# Patient Record
Sex: FEMALE | Race: BLACK OR AFRICAN AMERICAN | NOT HISPANIC OR LATINO | Employment: UNEMPLOYED | URBAN - METROPOLITAN AREA
[De-identification: names, ages, dates, MRNs, and addresses within clinical notes are randomized per-mention and may not be internally consistent; named-entity substitution may affect disease eponyms.]

---

## 2017-03-29 ENCOUNTER — OFFICE VISIT (OUTPATIENT)
Dept: CARDIOLOGY | Facility: CLINIC | Age: 52
End: 2017-03-29
Payer: MEDICAID

## 2017-03-29 VITALS
OXYGEN SATURATION: 97 % | DIASTOLIC BLOOD PRESSURE: 74 MMHG | SYSTOLIC BLOOD PRESSURE: 143 MMHG | HEIGHT: 62 IN | HEART RATE: 71 BPM | BODY MASS INDEX: 51.71 KG/M2 | WEIGHT: 281 LBS

## 2017-03-29 DIAGNOSIS — E66.01 MORBID OBESITY DUE TO EXCESS CALORIES: ICD-10-CM

## 2017-03-29 DIAGNOSIS — R06.09 DYSPNEA ON EXERTION: ICD-10-CM

## 2017-03-29 DIAGNOSIS — G47.33 OSA (OBSTRUCTIVE SLEEP APNEA): ICD-10-CM

## 2017-03-29 DIAGNOSIS — I1A.0 RESISTANT HYPERTENSION: Chronic | ICD-10-CM

## 2017-03-29 DIAGNOSIS — R07.9 CHEST PAIN, UNSPECIFIED TYPE: Primary | ICD-10-CM

## 2017-03-29 PROCEDURE — 99204 OFFICE O/P NEW MOD 45 MIN: CPT | Mod: S$GLB,,, | Performed by: INTERNAL MEDICINE

## 2017-03-29 RX ORDER — HYDRALAZINE HYDROCHLORIDE 100 MG/1
1 TABLET, FILM COATED ORAL 3 TIMES DAILY
COMMUNITY
Start: 2017-03-21

## 2017-03-29 RX ORDER — LEVOFLOXACIN 500 MG/1
1 TABLET, FILM COATED ORAL DAILY
COMMUNITY
Start: 2017-03-21

## 2017-03-29 RX ORDER — BUTALBITAL, ACETAMINOPHEN AND CAFFEINE 300; 40; 50 MG/1; MG/1; MG/1
1 CAPSULE ORAL DAILY
COMMUNITY
Start: 2017-02-23 | End: 2020-06-16

## 2017-03-29 RX ORDER — LOSARTAN POTASSIUM AND HYDROCHLOROTHIAZIDE 25; 100 MG/1; MG/1
1 TABLET ORAL DAILY
COMMUNITY
Start: 2017-03-21 | End: 2020-06-16

## 2017-03-29 NOTE — PROGRESS NOTES
Subjective:   Chief Complaint:  Kathleen Vasquez is a 51 y.o. female who presents for evaluation of Consult and Chest Pain      HPI:     Chest Pain:  Kathleen Vasquez complains of chest discomfort. Onset was 3 weeks ago. Symptoms have worsened since that time. The patient's pain is intermittent. The patient describes the pain as pressure and radiates to the left arm. Patient rates pain as a 6/10 in intensity. Associated symptoms are: dyspnea. Aggravating factors are: none. Alleviating factors are: position change and rest. Patient's cardiac risk factors are: diabetes mellitus, hypertension, obesity (BMI >= 30 kg/m2) and sedentary lifestyle. Patient's risk factors for DVT/PE: none. Previous cardiac testing: none.    Dyspnea has been moderate, and generally lasting a few minutes. Episodes usually occur intermittently and have been unchanged. Associated symptoms include: chest pain .. The symptoms are aggravated by nothing, and relieved by nothing.     She has a history of sleep apnea also diagnosed by sleep study, but not treated with CPAP because she did not have insurance at the time.  BP is frequently elevated at home.    Patient Active Problem List    Diagnosis Date Noted    Chest pain 2017    Dyspnea on exertion 2017    Resistant hypertension 2017    SHENG (obstructive sleep apnea) 2015    HLD (hyperlipidemia) 2015    GERD (gastroesophageal reflux disease) 2015    Migraine headache 2015    Diverticulosis 2014    Hemorrhoids, internal 2014    Rectal bleeding 2014    Morbid obesity 10/31/2014    HTN (hypertension), malignant 2014    Constipation 2014           Right Arm BP - Sittin/73  Left Arm BP - Sittin/74    Current Outpatient Prescriptions   Medication Sig    amlodipine (NORVASC) 10 MG tablet Take 1 tablet (10 mg total) by mouth once daily.    aspirin (ECOTRIN) 81 MG EC tablet Take 81 mg by mouth once daily.     butalbital-acetaminophen-caff -40 mg Cap Take 1 tablet by mouth once daily.    carvedilol (COREG) 25 MG tablet Take 1 tablet (25 mg total) by mouth 2 (two) times daily with meals.    hydrALAZINE (APRESOLINE) 100 MG tablet Take 1 tablet by mouth 3 (three) times daily.    levoFLOXacin (LEVAQUIN) 500 MG tablet Take 1 tablet by mouth once daily.    losartan-hydrochlorothiazide 100-25 mg (HYZAAR) 100-25 mg per tablet Take 1 tablet by mouth once daily.     No current facility-administered medications for this visit.      Review of Systems   Constitution: Negative for diaphoresis, weakness and malaise/fatigue.   Cardiovascular: Positive for chest pain and dyspnea on exertion. Negative for claudication, cyanosis, irregular heartbeat, leg swelling, near-syncope, orthopnea, palpitations, paroxysmal nocturnal dyspnea and syncope.   Respiratory: Positive for shortness of breath, sleep disturbances due to breathing and snoring. Negative for cough, hemoptysis, sputum production and wheezing.    Hematologic/Lymphatic: Negative for bleeding problem. Does not bruise/bleed easily.   Skin: Negative for poor wound healing and rash.   Gastrointestinal: Negative for heartburn, hematemesis, hematochezia and melena.   Neurological: Negative for dizziness, light-headedness and loss of balance.   Psychiatric/Behavioral: Negative for altered mental status and depression.         Objective:   Physical Exam   Constitutional: She is oriented to person, place, and time. She appears well-developed and well-nourished. No distress. She is not intubated.   HENT:   Head: Atraumatic.   Neck: No JVD present.   Cardiovascular: Normal rate, regular rhythm, S1 normal, S2 normal, normal heart sounds and intact distal pulses.  PMI is not displaced.  Exam reveals no gallop, no S3, no S4, no distant heart sounds, no friction rub, no midsystolic click and no opening snap.    No murmur heard.  Pulses:       Carotid pulses are 2+ on the right side,  and 2+ on the left side.       Radial pulses are 2+ on the right side, and 2+ on the left side.   Pulmonary/Chest: Effort normal and breath sounds normal. No accessory muscle usage. No apnea, no tachypnea and no bradypnea. She is not intubated. No respiratory distress. She has no decreased breath sounds. She has no wheezes. She has no rhonchi. She has no rales. She exhibits no tenderness.   Abdominal: Soft. Normal aorta and bowel sounds are normal. She exhibits no distension, no abdominal bruit, no pulsatile midline mass and no mass. There is no tenderness.   Musculoskeletal: She exhibits no edema.   Neurological: She is alert and oriented to person, place, and time.   Skin: Skin is warm. No rash noted. No erythema. No pallor.   Psychiatric: She has a normal mood and affect. Her behavior is normal. Judgment and thought content normal.   Vitals reviewed.      LABS    LAST HbA1c  No results found for: HGBA1C    Lipid panel  No results found for: CHOL  No results found for: HDL  No results found for: LDLCALC  No results found for: TRIG  No results found for: CHOLHDL     CMP  Sodium   Date Value Ref Range Status   07/31/2014 136 136 - 145 mmol/L Final     Potassium   Date Value Ref Range Status   07/31/2014 4.2 3.5 - 5.1 mmol/L Final     Chloride   Date Value Ref Range Status   07/31/2014 102 95 - 110 mmol/L Final     CO2   Date Value Ref Range Status   07/31/2014 25 23 - 29 mmol/L Final     Glucose   Date Value Ref Range Status   07/31/2014 100 70 - 110 mg/dL Final     BUN, Bld   Date Value Ref Range Status   07/31/2014 19 6 - 20 mg/dL Final     Creatinine   Date Value Ref Range Status   07/31/2014 1.1 0.5 - 1.4 mg/dL Final     Calcium   Date Value Ref Range Status   07/31/2014 9.4 8.7 - 10.5 mg/dL Final     Total Protein   Date Value Ref Range Status   07/31/2014 8.1 6.0 - 8.4 g/dL Final     Albumin   Date Value Ref Range Status   07/31/2014 3.7 3.5 - 5.2 g/dL Final     Total Bilirubin   Date Value Ref Range Status    07/31/2014 0.6 0.1 - 1.0 mg/dL Final     Comment:     For infants and newborns, interpretation of results should be based  on gestational age, weight and in agreement with clinical  observations.  Premature Infant recommended reference ranges:  Up to 24 hours.............<8.0 mg/dL  Up to 48 hours............<12.0 mg/dL  3-5 days..................<15.0 mg/dL  6-29 days.................<15.0 mg/dL       Alkaline Phosphatase   Date Value Ref Range Status   07/31/2014 112 55 - 135 U/L Final     AST   Date Value Ref Range Status   07/31/2014 17 10 - 40 U/L Final     ALT   Date Value Ref Range Status   07/31/2014 17 10 - 44 U/L Final     Anion Gap   Date Value Ref Range Status   07/31/2014 9 8 - 16 mmol/L Final     eGFR if    Date Value Ref Range Status   07/31/2014 >60.0 >60 mL/min/1.73 m^2 Final     eGFR if non    Date Value Ref Range Status   07/31/2014 58.7 (A) >60 mL/min/1.73 m^2 Final     Comment:     Calculation used to obtain the estimated glomerular filtration  rate (eGFR) is the CKD-EPI equation. Since race is unknown   in our information system, the eGFR values for   -American and Non--American patients are given   for each creatinine result.         Lab Results   Component Value Date    WBC 6.90 07/31/2014    HGB 12.5 07/31/2014    HCT 38.0 07/31/2014    MCV 87 07/31/2014     07/31/2014       Assessment:     1. Chest pain, unspecified type    2. Dyspnea on exertion    3. SHENG (obstructive sleep apnea)    4. Morbid obesity due to excess calories    5. Resistant hypertension        Plan:   -PET cardiac stress test (due to morbid obesity - MPS will be less accurate and stress echo likely suboptimal imaging.  Patient appears unable to complete treadmill stress testing)  -TTE to evaluate for hypertensive heart disease  -BNP, CMP  -RTC 6 weeks following testing.  -She may need addition of Aldactone to current 5 drugs for hypertension control - address at next  visit.  -She also needs to f/u with her PCP to discuss ordering/acquiring CPAP to treat her SHENG.  Her SHENG along with morbid obesity is likely contributing to resistant hypertension.  -Depending on course of her hypertension, may be a candidate for referral to an interventional hypertension trial at [facility] (CALM-FIM or RADIANCE-HTN).  -Patient educated on weight loss and reduction in sodium intake.    Greater than 50% of the visit of 45 minutes was spent counseling, educating, and coordinating the care of the patient.    I have reviewed the patient's medical history in detail and updated the computerized patient record.    Orders Placed This Encounter   Procedures    Brain natriuretic peptide     Standing Status:   Future     Standing Expiration Date:   5/28/2018    Comprehensive metabolic panel     Standing Status:   Future     Standing Expiration Date:   5/28/2018    Cardiac PET Scan Stress     Standing Status:   Future     Standing Expiration Date:   3/29/2018    2D echo with color flow doppler     Standing Status:   Future     Standing Expiration Date:   3/29/2018       Follow up as scheduled. Return sooner for concerns or questions      She expressed verbal understanding and agreed with the plan          Gareth Barone MD, FACC, CCDS  Interventional Cardiology  [facility]

## 2017-04-11 ENCOUNTER — TELEPHONE (OUTPATIENT)
Dept: CARDIOLOGY | Facility: CLINIC | Age: 52
End: 2017-04-11

## 2017-04-11 NOTE — TELEPHONE ENCOUNTER
----- Message from [employer] sent at 4/11/2017  3:41 PM CDT -----  [facility]  [phone]  Akshat states he need more information to authorize the PET SCAN test for the pt.   Please advise

## 2017-04-19 NOTE — TELEPHONE ENCOUNTER
Called [facility] for follow up as I had not heard anything from them.  Spoke with Mariluz, who stated he has to call me back after checking to see what information is needed/had been received.

## 2017-04-20 ENCOUNTER — TELEPHONE (OUTPATIENT)
Dept: CARDIOLOGY | Facility: CLINIC | Age: 52
End: 2017-04-20

## 2017-04-20 NOTE — TELEPHONE ENCOUNTER
Contacted pt regarding PET stress test that has been denied by her insurance company.  Left msg for pt to contact ordering MD to see what other test will be good for her.  Also sent msg to MD regarding the situation.

## 2017-04-21 DIAGNOSIS — R07.9 CHEST PAIN, UNSPECIFIED TYPE: Primary | ICD-10-CM

## 2017-04-25 NOTE — TELEPHONE ENCOUNTER
Left another message for patient to contact office to schedule the test recommended during visit as well as nuclear stress since PET stress was denied by insurance.

## 2019-09-22 ENCOUNTER — OUTSIDE PLACE OF SERVICE (OUTPATIENT)
Dept: CARDIOLOGY | Facility: CLINIC | Age: 54
End: 2019-09-22
Payer: MEDICAID

## 2019-09-22 PROCEDURE — 93010 ELECTROCARDIOGRAM REPORT: CPT | Mod: ,,, | Performed by: INTERNAL MEDICINE

## 2019-09-22 PROCEDURE — 93010 PR ELECTROCARDIOGRAM REPORT: ICD-10-PCS | Mod: 76,,, | Performed by: INTERNAL MEDICINE

## 2019-09-23 ENCOUNTER — OUTSIDE PLACE OF SERVICE (OUTPATIENT)
Dept: CARDIOLOGY | Facility: CLINIC | Age: 54
End: 2019-09-23
Payer: MEDICAID

## 2019-09-23 ENCOUNTER — CLINICAL SUPPORT (OUTPATIENT)
Dept: CARDIOLOGY | Facility: CLINIC | Age: 54
End: 2019-09-23
Attending: INTERNAL MEDICINE
Payer: MEDICAID

## 2019-09-23 DIAGNOSIS — R55 SYNCOPE AND COLLAPSE: ICD-10-CM

## 2019-09-23 LAB
AORTIC VALVE CUSP SEPERATION: 2 CM
ASCENDING AORTA: 3 CM
AV INDEX (PROSTH): 0.7
AV MEAN GRADIENT: 5 MMHG
AV PEAK GRADIENT: 10 MMHG
AV VALVE AREA: 1.98 CM2
AV VELOCITY RATIO: 0.63
CV ECHO LV RWT: 0.56 CM
DOP CALC AO PEAK VEL: 1.6 M/S
DOP CALC AO VTI: 39 CM
DOP CALC LVOT AREA: 2.8 CM2
DOP CALC LVOT DIAMETER: 1.9 CM
DOP CALC LVOT PEAK VEL: 1 M/S
DOP CALC LVOT STROKE VOLUME: 77.08 CM3
DOP CALC MV VTI: 30 CM
DOP CALCLVOT PEAK VEL VTI: 27.2 CM
E WAVE DECELERATION TIME: 178 MSEC
E/A RATIO: 1.11
E/E' RATIO: 22.83 M/S
ECHO LV POSTERIOR WALL: 1.2 CM (ref 0.6–1.1)
FRACTIONAL SHORTENING: 44 % (ref 28–44)
INTERVENTRICULAR SEPTUM: 1.8 CM (ref 0.6–1.1)
IVC PROX: 2.3 CM
IVRT: 134 MSEC
LA MAJOR: 5.8 CM
LA MINOR: 4.1 CM
LA WIDTH: 4.5 CM
LEFT ATRIUM SIZE: 3.9 CM
LEFT ATRIUM VOLUME: 71.66 CM3
LEFT INTERNAL DIMENSION IN SYSTOLE: 2.4 CM (ref 2.1–4)
LEFT VENTRICLE DIASTOLIC VOLUME: 82 ML
LEFT VENTRICLE SYSTOLIC VOLUME: 20 ML
LEFT VENTRICULAR INTERNAL DIMENSION IN DIASTOLE: 4.3 CM (ref 3.5–6)
LEFT VENTRICULAR MASS: 258.11 G
LV LATERAL E/E' RATIO: 17.13 M/S
LV SEPTAL E/E' RATIO: 34.25 M/S
MV A" WAVE DURATION": 129 MSEC
MV MEAN GRADIENT: 3 MMHG
MV PEAK A VEL: 1.23 M/S
MV PEAK E VEL: 1.37 M/S
MV PEAK GRADIENT: 8 MMHG
MV STENOSIS PRESSURE HALF TIME: 55 MS
MV VALVE AREA BY CONTINUITY EQUATION: 2.57 CM2
MV VALVE AREA P 1/2 METHOD: 4 CM2
PISA TR MAX VEL: 2.74 M/S
PULM VEIN A" WAVE DURATION": 88 MSEC
PULM VEIN S/D RATIO: 1.19
PV MEAN GRADIENT: 2 MMHG
PV PEAK D VEL: 0.53 M/S
PV PEAK S VEL: 0.63 M/S
PV PEAK VELOCITY: 0.82 CM/S
RA MAJOR: 4.9 CM
RA PRESSURE: 8 MMHG
RA WIDTH: 3.5 CM
RIGHT VENTRICULAR END-DIASTOLIC DIMENSION: 2.9 CM
SINUS: 3 CM
STJ: 2.5 CM
TDI LATERAL: 0.08 M/S
TDI SEPTAL: 0.04 M/S
TDI: 0.06 M/S
TR MAX PG: 30 MMHG
TRICUSPID ANNULAR PLANE SYSTOLIC EXCURSION: 2.1 CM
TV REST PULMONARY ARTERY PRESSURE: 38 MMHG

## 2019-09-23 PROCEDURE — 93010 PR ELECTROCARDIOGRAM REPORT: ICD-10-PCS | Mod: ,,, | Performed by: INTERNAL MEDICINE

## 2019-09-23 PROCEDURE — 93306 ECHO (CUPID ONLY): ICD-10-PCS | Mod: 26,,, | Performed by: STUDENT IN AN ORGANIZED HEALTH CARE EDUCATION/TRAINING PROGRAM

## 2019-09-23 PROCEDURE — 93306 TTE W/DOPPLER COMPLETE: CPT | Mod: 26,,, | Performed by: STUDENT IN AN ORGANIZED HEALTH CARE EDUCATION/TRAINING PROGRAM

## 2019-09-23 PROCEDURE — 93010 ELECTROCARDIOGRAM REPORT: CPT | Mod: ,,, | Performed by: STUDENT IN AN ORGANIZED HEALTH CARE EDUCATION/TRAINING PROGRAM

## 2019-09-23 PROCEDURE — 93010 ELECTROCARDIOGRAM REPORT: CPT | Mod: ,,, | Performed by: INTERNAL MEDICINE

## 2019-09-23 PROCEDURE — 93010 PR ELECTROCARDIOGRAM REPORT: ICD-10-PCS | Mod: ,,, | Performed by: STUDENT IN AN ORGANIZED HEALTH CARE EDUCATION/TRAINING PROGRAM

## 2019-10-04 ENCOUNTER — OFFICE VISIT (OUTPATIENT)
Dept: CARDIOLOGY | Facility: CLINIC | Age: 54
End: 2019-10-04
Payer: MEDICAID

## 2019-10-04 VITALS
HEART RATE: 71 BPM | HEIGHT: 62 IN | DIASTOLIC BLOOD PRESSURE: 69 MMHG | BODY MASS INDEX: 50.22 KG/M2 | WEIGHT: 272.88 LBS | OXYGEN SATURATION: 99 % | SYSTOLIC BLOOD PRESSURE: 146 MMHG

## 2019-10-04 DIAGNOSIS — I1A.0 RESISTANT HYPERTENSION: Chronic | ICD-10-CM

## 2019-10-04 DIAGNOSIS — E66.01 MORBID OBESITY: ICD-10-CM

## 2019-10-04 DIAGNOSIS — R06.09 DYSPNEA ON EXERTION: Primary | ICD-10-CM

## 2019-10-04 DIAGNOSIS — I10 HTN (HYPERTENSION), MALIGNANT: ICD-10-CM

## 2019-10-04 PROCEDURE — 99024 PR POST-OP FOLLOW-UP VISIT: ICD-10-PCS | Mod: S$GLB,,, | Performed by: STUDENT IN AN ORGANIZED HEALTH CARE EDUCATION/TRAINING PROGRAM

## 2019-10-04 PROCEDURE — 99024 POSTOP FOLLOW-UP VISIT: CPT | Mod: S$GLB,,, | Performed by: STUDENT IN AN ORGANIZED HEALTH CARE EDUCATION/TRAINING PROGRAM

## 2019-10-04 RX ORDER — CLONIDINE HYDROCHLORIDE 0.3 MG/1
0.3 TABLET ORAL 2 TIMES DAILY
COMMUNITY
End: 2019-10-04 | Stop reason: ALTCHOICE

## 2019-10-04 RX ORDER — CLONIDINE HYDROCHLORIDE 0.1 MG/1
0.1 TABLET ORAL 2 TIMES DAILY
Qty: 60 TABLET | Refills: 5 | Status: SHIPPED | OUTPATIENT
Start: 2019-10-04 | End: 2020-07-20 | Stop reason: SDUPTHER

## 2019-10-04 RX ORDER — SPIRONOLACTONE 25 MG/1
25 TABLET ORAL DAILY
Qty: 30 TABLET | Refills: 5 | Status: SHIPPED | OUTPATIENT
Start: 2019-10-04 | End: 2020-06-16

## 2019-10-04 NOTE — PROGRESS NOTES
"   Cardiology Clinic note    Subjective:   Patient ID:  Kathleen Vasquez is a 54 y.o. female who presents for follow-up of chest pain:      HPI:   Kathleen Vasquez  has a past medical history of Asthma, Hyperlipidemia, and Hypertension.    Patient was seen by Dr Barone in 2017, stress testing was done. Unknown result but likely negative. - unable to find report  - recent hospitalization 9/23-25 at [facility] for chest pain and syncope  - pt had lightheadedness after standing up. She then fell. At that time, she report hx of diarrhea.  - pt was given IVF in [facility]. Echo was done. WNL, no VHD.    10/4/19:  Here for follow up admission. No more chest pain, good home BP. SBP ~ 100. She notes she recently saw Dr. Quezada who added clonidine when she was told her BP was ok.  No more syncopal / passing out spelling  She noted a Left flank pain - 10/10. Lasted about 15 minutes. Improved with water intake.     Vitals  Vitals:    10/04/19 0911   BP: (!) 146/69   Pulse: 71   SpO2: 99%   Weight: 123.8 kg (272 lb 14.4 oz)   Height: 5' 2" (1.575 m)       Patient Active Problem List    Diagnosis Date Noted    Chest pain 03/29/2017    Dyspnea on exertion 03/29/2017    Resistant hypertension 03/29/2017    SHENG (obstructive sleep apnea) 02/25/2015    HLD (hyperlipidemia) 01/14/2015    GERD (gastroesophageal reflux disease) 01/14/2015    Migraine headache 01/14/2015    Diverticulosis 12/18/2014    Hemorrhoids, internal 12/18/2014    Rectal bleeding 12/18/2014    Morbid obesity 10/31/2014    HTN (hypertension), malignant 07/31/2014    Constipation 07/31/2014       Patient's Medications   New Prescriptions    CLONIDINE (CATAPRES) 0.1 MG TABLET    Take 1 tablet (0.1 mg total) by mouth 2 (two) times daily.    SPIRONOLACTONE (ALDACTONE) 25 MG TABLET    Take 1 tablet (25 mg total) by mouth once daily.   Previous Medications    AMLODIPINE (NORVASC) 10 MG TABLET    Take 1 tablet (10 mg total) by mouth once daily.    ASPIRIN " (ECOTRIN) 81 MG EC TABLET    Take 81 mg by mouth once daily.    BUTALBITAL-ACETAMINOPHEN-CAFF -40 MG CAP    Take 1 tablet by mouth once daily.    CARVEDILOL (COREG) 25 MG TABLET    Take 1 tablet (25 mg total) by mouth 2 (two) times daily with meals.    HYDRALAZINE (APRESOLINE) 100 MG TABLET    Take 1 tablet by mouth 3 (three) times daily.    LEVOFLOXACIN (LEVAQUIN) 500 MG TABLET    Take 1 tablet by mouth once daily.    LOSARTAN-HYDROCHLOROTHIAZIDE 100-25 MG (HYZAAR) 100-25 MG PER TABLET    Take 1 tablet by mouth once daily.   Modified Medications    No medications on file   Discontinued Medications    CLONIDINE (CATAPRES) 0.3 MG TABLET    Take 0.3 mg by mouth 2 (two) times daily.         Review of Systems   Constitution: Negative for chills, decreased appetite, malaise/fatigue and weight gain.   HENT: Negative for congestion and ear discharge.    Eyes: Negative for blurred vision and double vision.   Cardiovascular: Positive for chest pain, dyspnea on exertion and near-syncope. Negative for cyanosis, irregular heartbeat, leg swelling, orthopnea, palpitations and syncope.   Respiratory: Negative for cough, shortness of breath and sleep disturbances due to breathing.    Skin: Negative for color change and dry skin.   Musculoskeletal: Negative for joint pain, joint swelling and muscle cramps.   Gastrointestinal: Negative for bloating, heartburn, hematemesis and hematochezia.   Genitourinary: Negative for bladder incontinence and dysuria.   Neurological: Negative for aphonia, excessive daytime sleepiness, dizziness, focal weakness, headaches, light-headedness, loss of balance and weakness.   Psychiatric/Behavioral: Negative for altered mental status, depression and memory loss. The patient does not have insomnia and is not nervous/anxious.          Objective:   Physical Exam   Constitutional: She is oriented to person, place, and time. She appears well-developed and well-nourished.   HENT:   Head: Normocephalic  and atraumatic.   Eyes: Conjunctivae and EOM are normal.   Neck: Normal range of motion. Neck supple. No JVD present.   Cardiovascular: Normal rate, regular rhythm and normal heart sounds.   No murmur heard.  Pulmonary/Chest: Effort normal and breath sounds normal. No respiratory distress. She has no wheezes. She has no rales.   Abdominal: Soft. Bowel sounds are normal. She exhibits no distension.   Musculoskeletal: Normal range of motion. She exhibits no edema.   Neurological: She is alert and oriented to person, place, and time.   Skin: Skin is warm and dry. No erythema.   Psychiatric: She has a normal mood and affect. Her behavior is normal. Judgment and thought content normal.   Nursing note and vitals reviewed.      Lab Results    Lab Results   Component Value Date     07/31/2014    K 4.2 07/31/2014     07/31/2014    CO2 25 07/31/2014    BUN 19 07/31/2014    CREATININE 1.1 07/31/2014     07/31/2014    AST 17 07/31/2014    ALT 17 07/31/2014    ALBUMIN 3.7 07/31/2014    PROT 8.1 07/31/2014    BILITOT 0.6 07/31/2014    WBC 6.90 07/31/2014    HGB 12.5 07/31/2014    HCT 38.0 07/31/2014    MCV 87 07/31/2014     07/31/2014       Lipid panel  No results found for: CHOL  No results found for: HDL  No results found for: LDLCALC  No results found for: TRIG    Cardiac Studies  Significant Imaging: Echocardiogram:   2D echo with color flow doppler: No results found for this or any previous visit. and Transthoracic echo (TTE) complete (Cupid Only):   Results for orders placed or performed in visit on 09/23/19   Echo Color Flow Doppler? Yes   Result Value Ref Range    TDI SEPTAL 0.04 m/s    LV LATERAL E/E' RATIO 17.13 m/s    LV SEPTAL E/E' RATIO 34.25 m/s    LA WIDTH 4.50 cm    AORTIC VALVE CUSP SEPERATION 2 cm    TDI LATERAL 0.08 m/s    PV PEAK VELOCITY 0.82 cm/s    LVIDD 4.30 3.5 - 6.0 cm    IVS 1.80 0.6 - 1.1 cm    PW 1.20 0.6 - 1.1 cm    LVIDS 2.40 2.1 - 4.0 cm    FS 44 28 - 44 %    IVC proximal  "2.3 cm    LA volume 71.66 cm3    Sinus 3.00 cm    STJ 2.50 cm    Ascending aorta 3.00 cm    LV mass 258.11 g    LA size 3.90 cm    RVDD 2.90 cm    TAPSE 2.10 cm    Left Ventricle Relative Wall Thickness 0.56 cm    AV mean gradient 5 mmHg    AV valve area 1.98 cm2    AV Velocity Ratio 0.63     AV index (prosthetic) 0.70     MV mean gradient 3 mmHg    MV valve area p 1/2 method 4.00 cm2    MV valve area by continuity eq 2.57 cm2    E/A ratio 1.11     Mean e' 0.06 m/s    E wave decelartion time 178.00 msec    IVRT 134.00 msec    MV "A" wave duration 129.00 msec    Pulm vein "A" wave 88.00 msec    Pulm vein S/D ratio 1.19     LVOT diameter 1.90 cm    LVOT area 2.8 cm2    LVOT peak dony 1.0 m/s    LVOT peak VTI 27.20 cm    Ao peak dony 1.6 m/s    Ao VTI 39.00 cm    LVOT stroke volume 77.08 cm3    AV peak gradient 10 mmHg    MV peak gradient 8 mmHg    PV mean gradient 2 mmHg    E/E' ratio 22.83 m/s    MV Peak E Dony 1.37 m/s    TR Max Dony 2.74 m/s    MV VTI 30 cm    MV stenosis pressure 1/2 time 55 ms    MV Peak A Dony 1.23 m/s    PV Peak S Dony 0.63 m/s    PV Peak D Dony 0.53 m/s    LV Systolic Volume 20.00 mL    LV Diastolic Volume 82.00 mL    RA Major Axis 4.90 cm    Left Atrium Minor Axis 4.10 cm    Left Atrium Major Axis 5.80 cm    Triscuspid Valve Regurgitation Peak Gradient 30 mmHg    RA Width 3.50 cm    Right Atrial Pressure (from IVC) 8 mmHg    TV rest pulmonary artery pressure 38 mmHg    Narrative    · Normal left ventricular systolic function. The estimated ejection   fraction is 55%  · Concentric left ventricular hypertrophy.  · Normal right ventricular systolic function.  · No wall motion abnormalities.  · Grade II (moderate) left ventricular diastolic dysfunction consistent   with pseudonormalization.  · Elevated left atrial pressure.  · Mild left atrial enlargement.  · The estimated PA systolic pressure is 38 mm Hg  · Intermediate central venous pressure (8 mm Hg).        ECG:  normal EKG, normal sinus rhythm, " unchanged from previous tracings.    Cath study : n/a    Cr was 1.1 on 9/23  Negative trop as well  Assessment:     1. Dyspnea on exertion    2. HTN (hypertension), malignant    3. Morbid obesity    4. Resistant hypertension        Plan:     1. HTN - resistant  - now at gooal, mildly controlled  - c/w high dose coreg, norvasc, losartan-hctz  - will add back spirolactone, pt was on it in the hospital  - will dec clonidine 0.3 to 0.1 and hopefully taper off later    2. Obesity  - I spent 5-10 minutes asking, assessing, assisting, arranging and advising heart healthy diet improvements. This included low-salt meals, portion control and health food alternatives. I also encourage 30 minutes of moderate exercise 3-4x a week.     3. [place],  - likely exercise intolerance    Continue with current medical plan and lifestyle changes.  Return sooner for concerns or questions. If symptoms persist go to the ED  Total duration of face to face visit time 30 minutes.  Total time spent counseling greater than fifty percent of total visit time.  Counseling included discussion regarding imaging findings, diagnosis, possibilities, treatment options, risks and benefits.      No orders of the defined types were placed in this encounter.      Follow up as scheduled. Return to clinic in 3-4 weeks for bp check   She expressed verbal understanding and agreed with the plan    Thank you for the opportunity to care for this patient. Will be available for questions if needed.     Niall Manuel MD [facility]  Interventional Cardiology  [facility]  Pager: [phone]

## 2020-05-25 ENCOUNTER — TELEPHONE (OUTPATIENT)
Dept: CARDIOLOGY | Facility: CLINIC | Age: 55
End: 2020-05-25

## 2020-05-25 DIAGNOSIS — E78.5 HYPERLIPIDEMIA, UNSPECIFIED HYPERLIPIDEMIA TYPE: Primary | ICD-10-CM

## 2020-05-25 DIAGNOSIS — R07.9 CHEST PAIN, UNSPECIFIED TYPE: ICD-10-CM

## 2020-05-25 NOTE — TELEPHONE ENCOUNTER
"Reached out to pt regarding complaint of chest pain.    She reports that she had chest pressure about one hour ago with some left arm "tingling" and some SOB when the pain started. She said she was sitting in her chair when this occurred.  She took an Aspirin 325 mg and the pain resolved in about 15-20 min.  She reports that she also has neck pain that is constant.    She stated that she also had this pain a "couple of days ago" and resolved after a couple of minutes.     Instructed her to call 911 for emergent care when the chest pain occurs to be evaluated. Verbalized understanding.     Pt scheduled for soonest appt  6/16 for 1:20 in [facility], reminder placed for mail.  Former pt of Dr Manuel last seen 10/4/2019 in [facility].    Instructed her to reach out to us for any questions or concerns.  Verbalized understanding.    Reinforced need to contact emergent care when chest pain occurs.  "

## 2020-05-25 NOTE — TELEPHONE ENCOUNTER
----- Message from Milan Marroquin sent at 5/25/2020  3:31 PM CDT -----  Contact: [phone]/self  [employer]    Name of Caller:  PATIENT      Reason for Visit/Symptoms:  CHEST PAINS PATIENT STATES SHE'S HAVING A LITTLE PAIN NOW BUT DOES NOT FEEL LIKE SHE NEED TO GO TO THE ER    Best Contact Number or Confirm if [place] Preferred: [phone]  Preferred Date/Time of Appointment: ASAP AT THE [facility]   Interested in Virtual Visit: NO  Additional Information:NONE

## 2020-05-28 ENCOUNTER — LAB VISIT (OUTPATIENT)
Dept: LAB | Facility: HOSPITAL | Age: 55
End: 2020-05-28
Attending: INTERNAL MEDICINE
Payer: MEDICAID

## 2020-05-28 DIAGNOSIS — E78.5 HYPERLIPIDEMIA, UNSPECIFIED HYPERLIPIDEMIA TYPE: ICD-10-CM

## 2020-05-28 LAB
CHOLEST SERPL-MCNC: 135 MG/DL (ref 120–199)
CHOLEST/HDLC SERPL: 2.9 {RATIO} (ref 2–5)
HDLC SERPL-MCNC: 46 MG/DL (ref 40–75)
HDLC SERPL: 34.1 % (ref 20–50)
LDLC SERPL CALC-MCNC: 80 MG/DL (ref 63–159)
NONHDLC SERPL-MCNC: 89 MG/DL
TRIGL SERPL-MCNC: 45 MG/DL (ref 30–150)

## 2020-05-28 PROCEDURE — 36415 COLL VENOUS BLD VENIPUNCTURE: CPT | Mod: PO

## 2020-05-28 PROCEDURE — 80061 LIPID PANEL: CPT

## 2020-06-01 ENCOUNTER — TELEPHONE (OUTPATIENT)
Dept: CARDIOLOGY | Facility: CLINIC | Age: 55
End: 2020-06-01

## 2020-06-01 DIAGNOSIS — D64.9 ANEMIA, UNSPECIFIED TYPE: Primary | ICD-10-CM

## 2020-06-01 NOTE — TELEPHONE ENCOUNTER
Reached out to pt with test results per Dr Kelley.    He reviewed the blood work which is ok but she has mild anemia. He would like to get more blood work so he has ordered Iron studies and vitamin levels.     Verbalized undertanding.    Lab appt set at [place] location for 6/2 at 1:40 pm

## 2020-06-01 NOTE — TELEPHONE ENCOUNTER
----- Message from Kishore Kelley MD sent at 6/1/2020  8:06 AM CDT -----  Please let her know I reviewed the blood work which is ok bit she has mild anemia. I would like to get more blood work. I have ordered it Iron studies and vitamin levels. Thanks

## 2020-06-02 ENCOUNTER — LAB VISIT (OUTPATIENT)
Dept: LAB | Facility: HOSPITAL | Age: 55
End: 2020-06-02
Attending: INTERNAL MEDICINE
Payer: MEDICAID

## 2020-06-02 DIAGNOSIS — D64.9 ANEMIA, UNSPECIFIED TYPE: ICD-10-CM

## 2020-06-02 LAB
FERRITIN SERPL-MCNC: 150 NG/ML (ref 20–300)
IRON SERPL-MCNC: 53 UG/DL (ref 30–160)
RETICS/RBC NFR AUTO: 1.6 % (ref 0.5–2.5)
SATURATED IRON: 15 % (ref 20–50)
TOTAL IRON BINDING CAPACITY: 358 UG/DL (ref 250–450)
TRANSFERRIN SERPL-MCNC: 242 MG/DL (ref 200–375)
VIT B12 SERPL-MCNC: 803 PG/ML (ref 210–950)

## 2020-06-02 PROCEDURE — 82747 ASSAY OF FOLIC ACID RBC: CPT | Mod: PO

## 2020-06-02 PROCEDURE — 82728 ASSAY OF FERRITIN: CPT

## 2020-06-02 PROCEDURE — 83540 ASSAY OF IRON: CPT | Mod: PO

## 2020-06-02 PROCEDURE — 85045 AUTOMATED RETICULOCYTE COUNT: CPT | Mod: PO

## 2020-06-02 PROCEDURE — 82607 VITAMIN B-12: CPT | Mod: PO

## 2020-06-05 LAB — FOLATE RBC-MCNC: 655 NG/ML (ref 280–791)

## 2020-06-16 ENCOUNTER — TELEPHONE (OUTPATIENT)
Dept: CARDIOLOGY | Facility: CLINIC | Age: 55
End: 2020-06-16

## 2020-06-16 ENCOUNTER — OFFICE VISIT (OUTPATIENT)
Dept: CARDIOLOGY | Facility: CLINIC | Age: 55
End: 2020-06-16
Payer: MEDICAID

## 2020-06-16 VITALS
OXYGEN SATURATION: 98 % | WEIGHT: 264.13 LBS | BODY MASS INDEX: 48.61 KG/M2 | HEIGHT: 62 IN | SYSTOLIC BLOOD PRESSURE: 124 MMHG | HEART RATE: 58 BPM | DIASTOLIC BLOOD PRESSURE: 85 MMHG

## 2020-06-16 DIAGNOSIS — E61.1 IRON DEFICIENCY: ICD-10-CM

## 2020-06-16 DIAGNOSIS — E66.01 MORBID OBESITY: ICD-10-CM

## 2020-06-16 DIAGNOSIS — G47.33 OSA (OBSTRUCTIVE SLEEP APNEA): ICD-10-CM

## 2020-06-16 DIAGNOSIS — R07.9 CHEST PAIN, UNSPECIFIED TYPE: Primary | ICD-10-CM

## 2020-06-16 DIAGNOSIS — I1A.0 RESISTANT HYPERTENSION: Primary | Chronic | ICD-10-CM

## 2020-06-16 DIAGNOSIS — K62.5 RECTAL BLEEDING: ICD-10-CM

## 2020-06-16 DIAGNOSIS — R07.9 CHEST PAIN, UNSPECIFIED TYPE: ICD-10-CM

## 2020-06-16 DIAGNOSIS — R06.09 DYSPNEA ON EXERTION: ICD-10-CM

## 2020-06-16 PROCEDURE — 99214 PR OFFICE/OUTPT VISIT, EST, LEVL IV, 30-39 MIN: ICD-10-PCS | Mod: S$PBB,,, | Performed by: INTERNAL MEDICINE

## 2020-06-16 PROCEDURE — 93010 EKG 12-LEAD: ICD-10-PCS | Mod: S$PBB,,, | Performed by: INTERNAL MEDICINE

## 2020-06-16 PROCEDURE — 99214 OFFICE O/P EST MOD 30 MIN: CPT | Mod: S$PBB,,, | Performed by: INTERNAL MEDICINE

## 2020-06-16 PROCEDURE — 99999 PR PBB SHADOW E&M-EST. PATIENT-LVL IV: CPT | Mod: PBBFAC,,, | Performed by: INTERNAL MEDICINE

## 2020-06-16 PROCEDURE — 99214 OFFICE O/P EST MOD 30 MIN: CPT | Mod: PBBFAC,PO | Performed by: INTERNAL MEDICINE

## 2020-06-16 PROCEDURE — 93010 ELECTROCARDIOGRAM REPORT: CPT | Mod: S$PBB,,, | Performed by: INTERNAL MEDICINE

## 2020-06-16 PROCEDURE — 99999 PR PBB SHADOW E&M-EST. PATIENT-LVL IV: ICD-10-PCS | Mod: PBBFAC,,, | Performed by: INTERNAL MEDICINE

## 2020-06-16 PROCEDURE — 93005 ELECTROCARDIOGRAM TRACING: CPT | Mod: PBBFAC,PO | Performed by: INTERNAL MEDICINE

## 2020-06-16 RX ORDER — VALSARTAN AND HYDROCHLOROTHIAZIDE 160; 12.5 MG/1; MG/1
1 TABLET, FILM COATED ORAL DAILY
COMMUNITY
End: 2020-09-16

## 2020-06-16 RX ORDER — METFORMIN HYDROCHLORIDE 500 MG/1
500 TABLET ORAL 2 TIMES DAILY WITH MEALS
COMMUNITY

## 2020-06-16 RX ORDER — NIFEDIPINE 60 MG/1
60 TABLET, EXTENDED RELEASE ORAL DAILY
Qty: 30 TABLET | Refills: 11 | Status: SHIPPED | OUTPATIENT
Start: 2020-06-16 | End: 2020-09-16

## 2020-06-16 RX ORDER — SPIRONOLACTONE 25 MG/1
25 TABLET ORAL DAILY
Qty: 30 TABLET | Refills: 11 | Status: SHIPPED | OUTPATIENT
Start: 2020-06-16 | End: 2021-06-16

## 2020-06-16 RX ORDER — ZOLPIDEM TARTRATE 10 MG/1
5 TABLET ORAL NIGHTLY PRN
COMMUNITY

## 2020-06-16 RX ORDER — FERROUS SULFATE 324(65)MG
325 TABLET, DELAYED RELEASE (ENTERIC COATED) ORAL DAILY
Qty: 30 TABLET | Refills: 0 | Status: SHIPPED | OUTPATIENT
Start: 2020-06-16

## 2020-06-16 RX ORDER — MONTELUKAST SODIUM 10 MG/1
10 TABLET ORAL DAILY
COMMUNITY

## 2020-06-16 RX ORDER — ATORVASTATIN CALCIUM 20 MG/1
20 TABLET, FILM COATED ORAL NIGHTLY
COMMUNITY

## 2020-06-16 NOTE — TELEPHONE ENCOUNTER
Pharmacy requesting to clarify and confirm if pt is on both the spironolactone and valsartan-HCTZ due to high interaction between both meds     Please advise

## 2020-06-16 NOTE — PROGRESS NOTES
Subjective:   Patient ID:  Kathleen Vasquez is a 54 y.o. female who presents for follow-up of chest pain:      HPI:   Kathleen Vasquez  has a past medical history of Asthma, Hyperlipidemia, and Hypertension.    Patient used to f/u with Dr. Manuel and Dr. Barone  Had prior hospitalization with chest pain, and syncope.     She reports chest pain, intermittent last time was last Sunday. Left arm and left chest. Chest pain is chronic but for the last few months has been getting worse. She stated that she had stress test long time ago that was ok.  She reported that she had angio 10 year that was ok     Blood work showed anemia, mixed with iron def. No bleeding issues. Stated had colonscpy 5 years ago that was ok        Has issues with with HTN, currently on coreg 25 mg bid, Norvasc 10 mg, spironolactone 25 mg, hydralazine 100, valsartan/hctz and clonidine. PCP took her of spironolactone but LE swelling got worse. Also Clonidine was increased back to 0.3 mg    Renal U.S was ordered but was not done before.       Pertinent medical problems:  Resistant HTN, SHENG, hx or rectal bleeding      Pertinent cardiac hx    Echo 9/2019  · Normal left ventricular systolic function. The estimated ejection fraction is 55%  · Concentric left ventricular hypertrophy.  · Normal right ventricular systolic function.  · No wall motion abnormalities.  · Grade II (moderate) left ventricular diastolic dysfunction consistent with pseudonormalization.  · Elevated left atrial pressure.  · Mild left atrial enlargement.  · The estimated PA systolic pressure is 38 mm Hg  · Intermediate central venous pressure (8 mm Hg).      Patient Active Problem List    Diagnosis Date Noted    Chest pain 03/29/2017    Dyspnea on exertion 03/29/2017    Resistant hypertension 03/29/2017    SHENG (obstructive sleep apnea) 02/25/2015    HLD (hyperlipidemia) 01/14/2015    GERD (gastroesophageal reflux disease) 01/14/2015    Migraine headache 01/14/2015     Diverticulosis 12/18/2014    Hemorrhoids, internal 12/18/2014    Rectal bleeding 12/18/2014    Morbid obesity 10/31/2014    HTN (hypertension), malignant 07/31/2014    Constipation 07/31/2014       Patient's Medications   New Prescriptions    FERROUS SULFATE 324 MG (65 MG IRON) TBEC    Take 1 tablet (324 mg total) by mouth once daily.    NIFEDIPINE (PROCARDIA-XL) 60 MG (OSM) 24 HR TABLET    Take 1 tablet (60 mg total) by mouth once daily.    SPIRONOLACTONE (ALDACTONE) 25 MG TABLET    Take 1 tablet (25 mg total) by mouth once daily.   Previous Medications    ASPIRIN (ECOTRIN) 81 MG EC TABLET    Take 81 mg by mouth once daily.    ATORVASTATIN (LIPITOR) 20 MG TABLET    Take 20 mg by mouth every evening.    CARVEDILOL (COREG) 25 MG TABLET    Take 1 tablet (25 mg total) by mouth 2 (two) times daily with meals.    CLONIDINE (CATAPRES) 0.1 MG TABLET    Take 1 tablet (0.1 mg total) by mouth 2 (two) times daily.    HYDRALAZINE (APRESOLINE) 100 MG TABLET    Take 1 tablet by mouth 3 (three) times daily.    LEVOFLOXACIN (LEVAQUIN) 500 MG TABLET    Take 1 tablet by mouth once daily.    METFORMIN (GLUCOPHAGE) 500 MG TABLET    Take 500 mg by mouth 2 (two) times daily with meals.    MONTELUKAST (SINGULAIR) 10 MG TABLET    Take 10 mg by mouth once daily.    VALSARTAN-HYDROCHLOROTHIAZIDE (DIOVAN-HCT) 160-12.5 MG PER TABLET    Take 1 tablet by mouth once daily.    ZOLPIDEM (AMBIEN) 10 MG TAB    Take 5 mg by mouth nightly as needed.   Modified Medications    No medications on file   Discontinued Medications    AMLODIPINE (NORVASC) 10 MG TABLET    Take 1 tablet (10 mg total) by mouth once daily.    BUTALBITAL-ACETAMINOPHEN-CAFF -40 MG CAP    Take 1 tablet by mouth once daily.    LOSARTAN-HYDROCHLOROTHIAZIDE 100-25 MG (HYZAAR) 100-25 MG PER TABLET    Take 1 tablet by mouth once daily.    SPIRONOLACTONE (ALDACTONE) 25 MG TABLET    Take 1 tablet (25 mg total) by mouth once daily.         Review of Systems   Constitution:  Negative for chills, decreased appetite, malaise/fatigue and weight gain.   HENT: Negative for congestion and ear discharge.    Eyes: Negative for blurred vision and double vision.   Cardiovascular: Positive for chest pain, dyspnea on exertion and near-syncope. Negative for cyanosis, irregular heartbeat, leg swelling, orthopnea, palpitations and syncope.   Respiratory: Negative for cough, shortness of breath and sleep disturbances due to breathing.    Skin: Negative for color change and dry skin.   Musculoskeletal: Negative for joint pain, joint swelling and muscle cramps.   Gastrointestinal: Negative for bloating, heartburn, hematemesis and hematochezia.   Genitourinary: Negative for bladder incontinence and dysuria.   Neurological: Negative for aphonia, excessive daytime sleepiness, dizziness, focal weakness, headaches, light-headedness, loss of balance and weakness.   Psychiatric/Behavioral: Negative for altered mental status, depression and memory loss. The patient does not have insomnia and is not nervous/anxious.          Objective:   Physical Exam   Constitutional: She is oriented to person, place, and time. She appears well-developed and well-nourished.   HENT:   Head: Normocephalic and atraumatic.   Eyes: Conjunctivae and EOM are normal.   Neck: Normal range of motion. Neck supple. No JVD present.   Cardiovascular: Normal rate, regular rhythm and normal heart sounds.   No murmur heard.  Pulmonary/Chest: Effort normal and breath sounds normal. No respiratory distress. She has no wheezes. She has no rales.   Abdominal: Soft. Bowel sounds are normal. She exhibits no distension.   Musculoskeletal: Normal range of motion.         General: No edema.   Neurological: She is alert and oriented to person, place, and time.   Skin: Skin is warm and dry. No erythema.   Psychiatric: She has a normal mood and affect. Her behavior is normal. Judgment and thought content normal.   Nursing note and vitals reviewed.      Lab  Results    Lab Results   Component Value Date     05/28/2020    K 3.6 05/28/2020    CL 99 05/28/2020    CO2 31 (H) 05/28/2020    BUN 24 (H) 05/28/2020    CREATININE 1.12 05/28/2020     05/28/2020    AST 17 05/28/2020    ALT 12 05/28/2020    ALBUMIN 3.9 05/28/2020    PROT 7.3 05/28/2020    BILITOT 0.4 05/28/2020    WBC 7.20 05/28/2020    HGB 10.6 (L) 05/28/2020    HCT 34.6 (L) 05/28/2020    MCV 93 05/28/2020     05/28/2020    CHOL 135 05/28/2020    HDL 46 05/28/2020    LDLCALC 80.0 05/28/2020    TRIG 45 05/28/2020       Lipid panel  Lab Results   Component Value Date    CHOL 135 05/28/2020     Lab Results   Component Value Date    HDL 46 05/28/2020     Lab Results   Component Value Date    LDLCALC 80.0 05/28/2020     Lab Results   Component Value Date    TRIG 45 05/28/2020       Cardiac Studies  Significant Imaging: Echocardiogram:   2D echo with color flow doppler: No results found for this or any previous visit. and Transthoracic echo (TTE) complete ([facility]upid Only):   Results for orders placed or performed in visit on 09/23/19   Echo Color Flow Doppler? Yes   Result Value Ref Range    TDI SEPTAL 0.04 m/s    LV LATERAL E/E' RATIO 17.13 m/s    LV SEPTAL E/E' RATIO 34.25 m/s    LA WIDTH 4.50 cm    AORTIC VALVE CUSP SEPERATION 2 cm    TDI LATERAL 0.08 m/s    PV PEAK VELOCITY 0.82 cm/s    LVIDD 4.30 3.5 - 6.0 cm    IVS 1.80 0.6 - 1.1 cm    PW 1.20 0.6 - 1.1 cm    LVIDS 2.40 2.1 - 4.0 cm    FS 44 28 - 44 %    IVC proximal 2.3 cm    LA volume 71.66 cm3    Sinus 3.00 cm    STJ 2.50 cm    Ascending aorta 3.00 cm    LV mass 258.11 g    LA size 3.90 cm    RVDD 2.90 cm    TAPSE 2.10 cm    Left Ventricle Relative Wall Thickness 0.56 cm    AV mean gradient 5 mmHg    AV valve area 1.98 cm2    AV Velocity Ratio 0.63     AV index (prosthetic) 0.70     MV mean gradient 3 mmHg    MV valve area p 1/2 method 4.00 cm2    MV valve area by continuity eq 2.57 cm2    E/A ratio 1.11     Mean e' 0.06 m/s    E wave decelartion  "time 178.00 msec    IVRT 134.00 msec    MV "A" wave duration 129.00 msec    Pulm vein "A" wave 88.00 msec    Pulm vein S/D ratio 1.19     LVOT diameter 1.90 cm    LVOT area 2.8 cm2    LVOT peak dony 1.0 m/s    LVOT peak VTI 27.20 cm    Ao peak dony 1.6 m/s    Ao VTI 39.00 cm    LVOT stroke volume 77.08 cm3    AV peak gradient 10 mmHg    MV peak gradient 8 mmHg    PV mean gradient 2 mmHg    E/E' ratio 22.83 m/s    MV Peak E Dony 1.37 m/s    TR Max Dony 2.74 m/s    MV VTI 30 cm    MV stenosis pressure 1/2 time 55 ms    MV Peak A Dony 1.23 m/s    PV Peak S Dony 0.63 m/s    PV Peak D Dony 0.53 m/s    LV Systolic Volume 20.00 mL    LV Diastolic Volume 82.00 mL    RA Major Axis 4.90 cm    Left Atrium Minor Axis 4.10 cm    Left Atrium Major Axis 5.80 cm    Triscuspid Valve Regurgitation Peak Gradient 30 mmHg    RA Width 3.50 cm    Right Atrial Pressure (from IVC) 8 mmHg    TV rest pulmonary artery pressure 38 mmHg    Narrative    · Normal left ventricular systolic function. The estimated ejection   fraction is 55%  · Concentric left ventricular hypertrophy.  · Normal right ventricular systolic function.  · No wall motion abnormalities.  · Grade II (moderate) left ventricular diastolic dysfunction consistent   with pseudonormalization.  · Elevated left atrial pressure.  · Mild left atrial enlargement.  · The estimated PA systolic pressure is 38 mm Hg  · Intermediate central venous pressure (8 mm Hg).        ECG:  normal EKG, normal sinus rhythm, unchanged from previous tracings.      Assessment:     1. Resistant hypertension    2. Chest pain, unspecified type    3. Iron deficiency    4. SHENG (obstructive sleep apnea)    5. Dyspnea on exertion    6. Rectal bleeding    7. Morbid obesity        Plan:     - Check coronary CTA, given recurrent episodes chest pain. HR in 60s. On BB  - Restart spironolactone. Check renal U/S for OFELIA eval  - Switch Norvasc to Procardia  - I spent 5-10 minutes asking, assessing, assisting, arranging and " advising heart healthy diet improvements. This included low-salt meals, portion control and health food alternatives. I also encourage 30 minutes of moderate exercise 3-4x a week.   - Start iron supplements. F/U with PCP regarding anemia work up     Continue with current medical plan and lifestyle changes.  Return sooner for concerns or questions. If symptoms persist go to the ED  Total duration of face to face visit time 30 minutes.  Total time spent counseling greater than fifty percent of total visit time.  Counseling included discussion regarding imaging findings, diagnosis, possibilities, treatment options, risks and benefits.      Orders Placed This Encounter   Procedures    US Renal Artery Stenosis [employer]     Standing Status:   Future     Standing Expiration Date:   6/16/2021     Order Specific Question:   May the Radiologist modify the order per protocol to meet the clinical needs of the patient?     Answer:   Yes    CTA Cardiac     Standing Status:   Future     Standing Expiration Date:   6/16/2021     Order Specific Question:   Is the patient allergic to iodine or contrast?     Answer:   No     Order Specific Question:   Is the patient on ANY Metformin drug such as Glucophage/Glucovance?           Should be off drug 48 hours after contrast. Check renal function before restart.     Answer:   Yes     Order Specific Question:   History of Kidney Disease - including: decreased kidney function, dialysis, kidney transplay, single kidney, kidney cancer, kidney surgery?     Answer:   None     Order Specific Question:   Does the patient have high blood pressure requiring medical treatment?     Answer:   Yes     Order Specific Question:   Diabetes?     Answer:   Yes     Order Specific Question:   May the Radiologist modify the order per protocol to meet the clinical needs of the patient?     Answer:   Yes       She expressed verbal understanding and agreed with the plan    Thank you for the opportunity to  care for this patient. Will be available for questions if needed.

## 2020-06-16 NOTE — PATIENT INSTRUCTIONS
Established High Blood Pressure    High blood pressure (hypertension) is a chronic disease. Often, healthcare providers dont know what causes it. But it can be caused by certain health conditions and medicines.  If you have high blood pressure, you may not have any symptoms. If you do have symptoms, they may include headache, dizziness, changes in your vision, chest pain, and shortness of breath. But even without symptoms, high blood pressure thats not treated raises your risk for heart attack and stroke. High blood pressure is a serious health risk and shouldnt be ignored.  A blood pressure reading is made up of two numbers: a higher number over a lower number. The top number is the systolic pressure. The bottom number is the diastolic pressure. A normal blood pressure is a systolic pressure of  less than 120 over a diastolic pressure of less than 80. You will see your blood pressure readings written together. For example, a person with a systolic pressure of 188 and a diastolic pressure of 78 will have 118/78 written in the medical record.  High blood pressure is when either the top number is 140 or higher, or the bottom number is 90 or higher. This must be the result when taking your blood pressure a number of times. The blood pressures between normal and high are called prehypertension.  Home care  If you have high blood pressure, you should do what is listed below to lower your blood pressure. If you are taking medicines for high blood pressure, these methods may reduce or end your need for medicines in the future.  · Begin a weight-loss program if you are overweight.  · Cut back on how much salt you get in your diet. Heres how to do this:  ¨ Dont eat foods that have a lot of salt. These include olives, pickles, smoked meats, and salted potato chips.  ¨ Dont add salt to your food at the table.  ¨ Use only small amounts of salt when cooking.  · Start an exercise program. Talk with your healthcare  provider about the type of exercise program that would be best for you. It doesn't have to be hard. Even brisk walking for 20 minutes 3 times a week is a good form of exercise.  · Dont take medicines that stimulate the heart. This includes many over-the-counter cold and sinus decongestant pills and sprays, as well as diet pills. Check the warnings about hypertension on the label. Before buying any over-the-counter medicines or supplements, always ask the pharmacist about the product's potential interaction with your high blood pressure and your high blood pressure medicines.  · Stimulants such as amphetamine or cocaine could be deadly for someone with high blood pressure. Never take these.  · Limit how much caffeine you get in your diet. Switch to caffeine-free products.  · Stop smoking. If you are a long-time smoker, this can be hard. Talk to your healthcare provider about medicines and nicotine replacement options to help you. Also, enroll in a stop-smoking program to make it more likely that you will quit for good.  · Learn how to handle stress. This is an important part of any program to lower blood pressure. Learn about relaxation methods like meditation, yoga, or biofeedback.  · If your provider prescribed medicines, take them exactly as directed. Missing doses may cause your blood pressure get out of control.  · If you miss a dose or doses, check with your healthcare provider or pharmacist about what to do.  · Consider buying an automatic blood pressure machine. Ask your provider for a recommendation. You can get one of these at most pharmacies.     The American Heart Association recommends the following guidelines for home blood pressure monitoring:  · Don't smoke or drink coffee for 30 minutes before taking your blood pressure.  · Go to the bathroom before the test.  · Relax for 5 minutes before taking the measurement.  · Sit with your back supported (don't sit on a couch or soft chair); keep your feet on  the floor uncrossed. Place your arm on a solid flat surface (like a table) with the upper part of the arm at heart level. Place the middle of the cuff directly above the eye of the elbow. Check the monitor's instruction manual for an illustration.  · Take multiple readings. When you measure, take 2 to 3 readings one minute apart and record all of the results.  · Take your blood pressure at the same time every day, or as your healthcare provider recommends.  · Record the date, time, and blood pressure reading.  · Take the record with you to your next medical appointment. If your blood pressure monitor has a built-in memory, simply take the monitor with you to your next appointment.  · Call your provider if you have several high readings. Don't be frightened by a single high blood pressure reading, but if you get several high readings, check in with your healthcare provider.  · Note: When blood pressure reaches a systolic (top number) of 180 or higher OR diastolic (bottom number) of 110 or higher, seek emergency medical treatment.  Follow-up care  You will need to see your healthcare provider regularly. This is to check your blood pressure and to make changes to your medicines. Make a follow-up appointment as directed. Bring the record of your home blood pressure readings to the appointment.  When to seek medical advice  Call your healthcare provider right away if any of these occur:  · Blood pressure reaches a systolic (upper number) of 180 or higher OR a diastolic (bottom number) of 110 or higher  · Chest pain or shortness of breath  · Severe headache  · Throbbing or rushing sound in the ears  · Nosebleed  · Sudden severe pain in your belly (abdomen)  · Extreme drowsiness, confusion, or fainting  · Dizziness or spinning sensation (vertigo)  · Weakness of an arm or leg or one side of the face  · You have problems speaking or seeing   Date Last Reviewed: 12/1/2016  © 1199-9116 [email or web address]. [address] [place] [address]  [place] [address]. All rights reserved. This information is not intended as a substitute for professional medical care. Always follow your healthcare professional's instructions.          Eating Heart-Healthy Foods  Eating has a big impact on your heart health. In fact, eating healthier can improve several of your heart risks at once. For instance, it helps you manage weight, cholesterol, and blood pressure. Here are ideas to help you make heart-healthy changes without giving up all the foods and flavors you love.  Getting started  · Talk with your health care provider about eating plans, such as the DASH or Mediterranean diet. You may also be referred to a dietitian.  · Change a few things at a time. Give yourself time to get used to a few eating changes before adding more.  · Work to create a tasty, healthy eating plan that you can stick to for the rest of your life.    Goals for healthy eating  Below are some tips to improve your eating habits:  · Limit saturated fats and trans fats. Saturated fats raise your levels of cholesterol, so keep these fats to a minimum. They are found in foods such as fatty meats, whole milk, cheese, and palm and coconut oils. Avoid trans fats because they lower good cholesterol as well as raise bad cholesterol. Trans fats are most often found in processed foods.  · Reduce sodium (salt) intake. Eating too much salt may increase your blood pressure. Limit your sodium intake to 2,300 milligrams (mg) per day, or less if your health care provider recommends it. Dining out less often and eating fewer processed foods are two great ways to decrease the amount of salt you consume.  · Managing calories. A calorie is a unit of energy. Your body burns calories for fuel, but if you eat more calories than your body burns, the extras are stored as fat. Your health care provider can help you create a diet plan to manage your calories. This will likely include eating healthier foods as well  as exercising regularly. To help you track your progress, keep a diary to record what you eat and how often you exercise.  Choose the right foods  Aim to make these foods staples of your diet. If you have diabetes, you may have different recommendations than what is listed here:  · Fruits and vegetable provide plenty of nutrients without a lot of calories. At meals, fill half your plate with these foods. Split the other half of your plate between whole grains and lean protein.  · Whole grains are high in fiber and rich in vitamins and nutrients. Good choices include whole-wheat bread, pasta, and brown rice.  · Lean proteins give you nutrition with less fat. Good choices include fish, skinless chicken, and beans.  · Low-fat or nonfat dairy provides nutrients without a lot of fat. Try low-fat or nonfat milk, cheese, or yogurt.  · Healthy fats can be good for you in small amounts. These are unsaturated fats, such as olive oil, nuts, and fish. Try to have at least 2 servings per week of fatty fish such as salmon, sardines, mackerel, rainbow trout, and albacore tuna. These contain omega-3 fatty acids, which are good for your heart. Flaxseed is another source of a heart-healthy fat.  More on heart healthy eating    Read food labels  Healthy eating starts at the grocery store. Be sure to pay attention to food labels on packaged foods. Look for products that are high in fiber and protein, and low in saturated fat, cholesterol, and sodium. Avoid products that contain trans fat. And pay close attention to serving size. For instance, if you plan to eat two servings, double all the numbers on the label.  Prepare food right  A key part of healthy cooking is cutting down on added fat and salt. Look on the internet for lower-fat, lower-sodium recipes. Also, try these tips:  · Remove fat from meat and skin from poultry before cooking.  · Skim fat from the surface of soups and sauces.  · Broil, boil, bake, steam, grill, and  microwave food without added fats.  · Choose ingredients that spice up your food without adding calories, fat, or sodium. Try these items: horseradish, hot sauce, lemon, mustard, nonfat salad dressings, and vinegar. For salt-free herbs and spices, try basil, cilantro, cinnamon, pepper, and rosemary.  Date Last Reviewed: 6/25/2015 © 2000-2017 [employer]. [address] [place] [address], [place] [address]. All rights reserved. This information is not intended as a substitute for professional medical care. Always follow your healthcare professional's instructions.          Aerobic Exercise for a Healthy Heart  Exercise is a lot more than an energy booster and a stress reliever. It also strengthens your heart muscle, lowers your blood pressure and cholesterol, and burns calories. It can also improve your resting muscle tone, and your mood.     Remember, some activity is better than none.    Choose an aerobic activity  Choose an activity that makes your heart and lungs work harder than they do when you rest or walk normally. This aerobic exercise can improve the way your heart and other muscles use oxygen. Make it fun by exercising with a friend and choosing an activity you enjoy. Here are some ideas:  · Walking  · Swimming  · Bicycling  · Stair climbing  · Dancing  · Jogging  · Gardening  Exercise regularly  If you havent been exercising regularly,  get your doctors OK first. Then start slowly.  Here are some tips:  · Begin exercising 3 times a week for 5 to 10 minutes at a time.  · When you feel comfortable, add a few minutes each session.  · Slowly build up to exercising 3 to 4 times each week. Each session should last for 40 minutes, on average, and involve moderate- to vigorous-intensity physical activity.  · If you have been given nitroglycerin, be sure to carry it when you exercise.  · If you get chest pain (angina) when youre exercising, stop what youre doing, take your nitroglycerin, and call your  doctor.  Date Last Reviewed: 6/2/2016  © 6175-5896 [employer]. [address] [place] [address], [place] [address]. All rights reserved. This information is not intended as a substitute for professional medical care. Always follow your healthcare professional's instructions.

## 2020-06-16 NOTE — TELEPHONE ENCOUNTER
----- Message from Amanda Mendez sent at 2020  4:56 PM CDT -----  [phone]/[facility]  Type:  Pharmacy Calling to Clarify an RX    Name of Caller:   Pharmacy Name: - [id number] - [place], [place] - [address];  Prescription Name: spironolactone (ALDACTONE) 25 MG tablet  What do they need to clarify?: there is a major interaction between valsartan-hydrochlorothiazide (DIOVAN-HCT) 160-12.5 mg per tablet. She filled the valsartan in May 09, 2020  Best Call Back Number: [phone]  Additional Information: Is she taking both of these?

## 2020-06-17 NOTE — TELEPHONE ENCOUNTER
----- Message from Amanda Mendze sent at 2020  9:09 AM CDT -----  [phone]  Type:  Pharmacy Calling to Clarify an RX    Name of Caller:   Pharmacy Name:  [id number] - [place], [place] - [address];  Prescription Name: spironolactone (ALDACTONE) 25 MG tablet  What do they need to clarify? Patient has rx for valsartan-hydrochlorothiazide (DIOVAN-HCT) 160-12.5 mg per tablet  Best Call Back Number: [phone]  Additional Information:  Major drug reaction. Does she take both or is this a replacement?

## 2020-06-17 NOTE — TELEPHONE ENCOUNTER
Called the pharmacy back in regards to this message.  Informed the pharmacist Dr. Kelley stated,  Yes she is to take both and repeat BMP in two weeks.    ND

## 2020-06-22 ENCOUNTER — TELEPHONE (OUTPATIENT)
Dept: CARDIOLOGY | Facility: CLINIC | Age: 55
End: 2020-06-22

## 2020-06-22 NOTE — TELEPHONE ENCOUNTER
Reached out to pt in regards to need of rescheduling appt for CTA Cardiac due to peer to peer insurance approval.    Darcie larkin    Rescheduled for July 13

## 2020-07-02 ENCOUNTER — HOSPITAL ENCOUNTER (OUTPATIENT)
Dept: RADIOLOGY | Facility: HOSPITAL | Age: 55
Discharge: HOME | End: 2020-07-02
Attending: INTERNAL MEDICINE
Payer: MEDICAID

## 2020-07-02 ENCOUNTER — TELEPHONE (OUTPATIENT)
Dept: CARDIOLOGY | Facility: CLINIC | Age: 55
End: 2020-07-02

## 2020-07-02 DIAGNOSIS — I1A.0 RESISTANT HYPERTENSION: ICD-10-CM

## 2020-07-02 PROCEDURE — 93975 US RENAL ARTERY STENOSIS HYPERTEN (XPD): ICD-10-PCS | Mod: 26,,, | Performed by: RADIOLOGY

## 2020-07-02 PROCEDURE — 93975 VASCULAR STUDY: CPT | Mod: 26,,, | Performed by: RADIOLOGY

## 2020-07-02 PROCEDURE — 93975 VASCULAR STUDY: CPT | Mod: TC

## 2020-07-02 NOTE — TELEPHONE ENCOUNTER
----- Message from Kishore Kelley MD sent at 7/2/2020  3:05 PM CDT -----  Please let her know that Ultrasound result is normal.

## 2020-07-02 NOTE — TELEPHONE ENCOUNTER
Reached out with test results per Dr Mccabesef  No questions or concerns expressed.     Verb understanding

## 2020-07-20 ENCOUNTER — HOSPITAL ENCOUNTER (EMERGENCY)
Facility: HOSPITAL | Age: 55
Discharge: HOME | End: 2020-07-20
Attending: EMERGENCY MEDICINE
Payer: MEDICAID

## 2020-07-20 ENCOUNTER — HOSPITAL ENCOUNTER (OUTPATIENT)
Dept: RADIOLOGY | Facility: HOSPITAL | Age: 55
Discharge: HOME | End: 2020-07-20
Attending: INTERNAL MEDICINE
Payer: MEDICAID

## 2020-07-20 VITALS
HEART RATE: 56 BPM | BODY MASS INDEX: 49.32 KG/M2 | SYSTOLIC BLOOD PRESSURE: 175 MMHG | WEIGHT: 268 LBS | DIASTOLIC BLOOD PRESSURE: 84 MMHG | TEMPERATURE: 98 F | RESPIRATION RATE: 18 BRPM | OXYGEN SATURATION: 100 % | HEIGHT: 62 IN

## 2020-07-20 VITALS — DIASTOLIC BLOOD PRESSURE: 106 MMHG | RESPIRATION RATE: 18 BRPM | SYSTOLIC BLOOD PRESSURE: 225 MMHG | HEART RATE: 64 BPM

## 2020-07-20 DIAGNOSIS — R07.9 CHEST PAIN, UNSPECIFIED TYPE: ICD-10-CM

## 2020-07-20 DIAGNOSIS — I10 HYPERTENSION: ICD-10-CM

## 2020-07-20 DIAGNOSIS — I10 ESSENTIAL HYPERTENSION: Primary | ICD-10-CM

## 2020-07-20 LAB
BILIRUB UR QL STRIP: NEGATIVE
CLARITY UR REFRACT.AUTO: CLEAR
COLOR UR AUTO: COLORLESS
CREAT SERPL-MCNC: 1.1 MG/DL (ref 0.5–1.4)
GLUCOSE UR QL STRIP: NEGATIVE
HGB UR QL STRIP: NEGATIVE
KETONES UR QL STRIP: NEGATIVE
LEUKOCYTE ESTERASE UR QL STRIP: NEGATIVE
NITRITE UR QL STRIP: NEGATIVE
PH UR STRIP: 7 [PH] (ref 5–8)
PROT UR QL STRIP: NEGATIVE
SAMPLE: NORMAL
SP GR UR STRIP: 1 (ref 1–1.03)
URN SPEC COLLECT METH UR: ABNORMAL

## 2020-07-20 PROCEDURE — 25000003 PHARM REV CODE 250: Performed by: EMERGENCY MEDICINE

## 2020-07-20 PROCEDURE — 96360 HYDRATION IV INFUSION INIT: CPT

## 2020-07-20 PROCEDURE — 93010 EKG 12-LEAD: ICD-10-PCS | Mod: ,,, | Performed by: INTERNAL MEDICINE

## 2020-07-20 PROCEDURE — 80047 BASIC METABLC PNL IONIZED CA: CPT

## 2020-07-20 PROCEDURE — 99284 EMERGENCY DEPT VISIT MOD MDM: CPT | Mod: ,,, | Performed by: EMERGENCY MEDICINE

## 2020-07-20 PROCEDURE — 99284 EMERGENCY DEPT VISIT MOD MDM: CPT | Mod: 25

## 2020-07-20 PROCEDURE — 93005 ELECTROCARDIOGRAM TRACING: CPT

## 2020-07-20 PROCEDURE — 99284 PR EMERGENCY DEPT VISIT,LEVEL IV: ICD-10-PCS | Mod: ,,, | Performed by: EMERGENCY MEDICINE

## 2020-07-20 PROCEDURE — 81003 URINALYSIS AUTO W/O SCOPE: CPT

## 2020-07-20 PROCEDURE — 93010 ELECTROCARDIOGRAM REPORT: CPT | Mod: ,,, | Performed by: INTERNAL MEDICINE

## 2020-07-20 RX ORDER — CLONIDINE HYDROCHLORIDE 0.3 MG/1
0.3 TABLET ORAL
Status: COMPLETED | OUTPATIENT
Start: 2020-07-20 | End: 2020-07-20

## 2020-07-20 RX ORDER — ACETAMINOPHEN 500 MG
1000 TABLET ORAL
Status: COMPLETED | OUTPATIENT
Start: 2020-07-20 | End: 2020-07-20

## 2020-07-20 RX ORDER — CLONIDINE HYDROCHLORIDE 0.3 MG/1
0.3 TABLET ORAL 2 TIMES DAILY
Qty: 60 TABLET | Refills: 0 | Status: SHIPPED | OUTPATIENT
Start: 2020-07-20 | End: 2020-08-19

## 2020-07-20 RX ADMIN — SODIUM CHLORIDE 1000 ML: 0.9 INJECTION, SOLUTION INTRAVENOUS at 02:07

## 2020-07-20 RX ADMIN — ACETAMINOPHEN 1000 MG: 500 TABLET ORAL at 02:07

## 2020-07-20 RX ADMIN — CLONIDINE HYDROCHLORIDE 0.3 MG: 0.3 TABLET ORAL at 02:07

## 2020-07-20 NOTE — PROVIDER PROGRESS NOTES - EMERGENCY DEPT.
Emergency Department TeleTRIAGE Encounter Note      CHIEF COMPLAINT    Chief Complaint   Patient presents with    Hypertension     sent from ct across [place], bp uzair, headache since last night       VITAL SIGNS   Initial Vitals [07/20/20 1354]   BP Pulse Resp Temp SpO2   (!) 246/119 66 18 98.5 °F (36.9 °C) 98 %      MAP       --            ALLERGIES    Review of patient's allergies indicates:  No Known Allergies    PROVIDER TRIAGE NOTE   Patient was sent from primary care due to uncontrolled blood pressure.  Her only complaint is mild headache.  She states she was on clonidine 0.3 p.o. b.i.d. but was taken off that about 1 week ago.  I have ordered baseline labs as well as 0.3 of clonidine p.o..  I will defer additional treatment for blood pressure and/or further evaluation to my colleague did evaluate the patient face-to-face in the emergency department.      ORDERS  Labs Reviewed   CBC W/ AUTO DIFFERENTIAL   COMPREHENSIVE METABOLIC PANEL   URINALYSIS       ED Orders (720h ago, onward)    Start Ordered     Status Ordering Provider    07/20/20 1415 07/20/20 1410  cloNIDine tablet 0.3 mg  ED 1 Time      Ordered GILLES HONEYCUTT    07/20/20 1415 07/20/20 1412  acetaminophen tablet 1,000 mg  ED 1 Time      Ordered GILLES HONEYCUTT    07/20/20 1413 07/20/20 1412  CBC auto differential  STAT  Collect    Ordered GILLES HONEYCUTT    07/20/20 1413 07/20/20 1412  Comprehensive metabolic panel  STAT  Collect    Ordered GILLES HONEYCUTT    07/20/20 1413 07/20/20 1412  Urinalysis Only  STAT      Ordered GILLES HONEYCUTT    07/20/20 1413 07/20/20 1412  X-Ray Chest PA And Lateral  1 time imaging      Ordered GILLES HONEYCUTT    07/20/20 1412 07/20/20 1412  Saline lock IV  Once      Ordered GILLES HONEYCUTT    07/20/20 1412 07/20/20 1412  Cardiac Monitoring - Adult  Continuous     Comments: Notify Physician If:    Ordered GILLES HONEYCUTT    07/20/20 1412 07/20/20 1412  EKG 12-lead  Once      Ordered GILLES HONEYCUTT            Virtual Visit Note: The provider  triage portion of this emergency department evaluation and documentation was performed via [employer], a HIPAA-compliant telemedicine application, in concert with a tele-presenter in the room. A face to face patient evaluation with one of my colleagues will occur once the patient is placed in an emergency department room.      DISCLAIMER: This note was prepared with Loc[employer] voice recognition transcription software. Garbled syntax, mangled pronouns, and other bizarre constructions may be attributed to that software system.

## 2020-07-20 NOTE — NURSING
Pt here for CTA. Pt reports compliance with BP meds. Pt reports intermittent blurred vision for the last 2 weeks. She also states HA last night.Ordering provider (MELANIE MUNOZ) alerted of elevated BP. See flowsheets. Pt transferred to ED via wc in NAD. Report given to triage nurse.

## 2020-07-20 NOTE — PROVIDER PROGRESS NOTES - EMERGENCY DEPT.
Encounter Date: 7/20/2020    ED Physician Progress Notes         EKG - STEMI Decision  Initial Reading: No STEMI present.

## 2020-07-20 NOTE — ED PROVIDER NOTES
Source of History:  Patient    Chief complaint:  Hypertension (sent from ct across [place], bp uzair, headache since last night)      HPI:  Kathleen Vasquez is a 54 y.o. female presenting with elevated blood pressure.  She also had some headache last night.  She denies any headache currently.  She states that she has been out of her clonidine and her prescription was not refilled by the pharmacy as they had no refills left.  She takes 0.3 mg twice a day.  She denies chest pain, shortness of breath, urinary problems, weakness and numbness.    ROS: As per HPI and below:  General: No fever.  No chills.  Eyes: No visual changes.  Head: Notes headache.    Integument: No rashes or lesions.  Chest: No shortness of breath.  Cardiovascular: No chest pain.  Abdomen: No abdominal pain.  No nausea or vomiting.  Urinary: No abnormal urination.  Neurologic: No focal weakness.  No numbness.  Hematologic: No easy bruising.  Endocrine: No excessive thirst or urination.      Review of patient's allergies indicates:  No Known Allergies    No current facility-administered medications on file prior to encounter.      Current Outpatient Medications on File Prior to Encounter   Medication Sig Dispense Refill    aspirin (ECOTRIN) 81 MG EC tablet Take 81 mg by mouth once daily.      atorvastatin (LIPITOR) 20 MG tablet Take 20 mg by mouth every evening.      carvedilol (COREG) 25 MG tablet Take 1 tablet (25 mg total) by mouth 2 (two) times daily with meals. 180 tablet 3    ferrous sulfate 324 mg (65 mg iron) TbEC Take 1 tablet (324 mg total) by mouth once daily. 30 tablet 0    hydrALAZINE (APRESOLINE) 100 MG tablet Take 1 tablet by mouth 3 (three) times daily.      metFORMIN (GLUCOPHAGE) 500 MG tablet Take 500 mg by mouth 2 (two) times daily with meals.      montelukast (SINGULAIR) 10 mg tablet Take 10 mg by mouth once daily.      spironolactone (ALDACTONE) 25 MG tablet Take 1 tablet (25 mg total) by mouth once daily. 30 tablet 11     valsartan-hydrochlorothiazide (DIOVAN-HCT) 160-12.5 mg per tablet Take 1 tablet by mouth once daily.      zolpidem (AMBIEN) 10 mg Tab Take 5 mg by mouth nightly as needed.      [DISCONTINUED] cloNIDine (CATAPRES) 0.1 MG tablet Take 1 tablet (0.1 mg total) by mouth 2 (two) times daily. (Patient taking differently: Take 0.3 mg by mouth 2 (two) times daily. ) 60 tablet 5    levoFLOXacin (LEVAQUIN) 500 MG tablet Take 1 tablet by mouth once daily.      NIFEdipine (PROCARDIA-XL) 60 MG (OSM) 24 hr tablet Take 1 tablet (60 mg total) by mouth once daily. 30 tablet 11       PMH:  As per HPI and below:  Past Medical History:   Diagnosis Date    Asthma     Hyperlipidemia     Hypertension      Past Surgical History:   Procedure Laterality Date    HYSTERECTOMY         Social History     Socioeconomic History    Marital status:      Spouse name: Not on file    Number of children: Not on file    Years of education: Not on file    Highest education level: Not on file   Occupational History    Not on file   Social Needs    Financial resource strain: Not on file    Food insecurity     Worry: Not on file     Inability: Not on file    Transportation needs     Medical: Not on file     Non-medical: Not on file   Tobacco Use    Smoking status: Never Smoker   Substance and Sexual Activity    Alcohol use: No    Drug use: No    Sexual activity: Yes     Partners: Male   Lifestyle    Physical activity     Days per week: Not on file     Minutes per session: Not on file    Stress: Not on file   Relationships    Social connections     Talks on phone: Not on file     Gets together: Not on file     Attends [employer] service: Not on file     Active member of club or organization: Not on file     Attends meetings of clubs or organizations: Not on file     Relationship status: Not on file   Other Topics Concern    Not on file   Social History Narrative    Not on file       Family History   Problem Relation Age of  Onset    Arrhythmia Mother     Hypertension Father     Heart disease Father        Physical Exam:    Vitals:    07/20/20 1446 07/20/20 1502 07/20/20 1503 07/20/20 1535   BP: (!) 208/99 (!) 242/107  (!) 175/84   Pulse: (!) 58  60 (!) 56   Resp: 15  14 18   Temp:       TempSrc:       SpO2: 99%  100%    Weight:       Height:         Appearance:  No acute distress.  Comfortable.  Skin:  No rashes.  Good turgor.  Head: Normocephalic.  Atraumatic.    Eyes: No conjunctival injection.  No swelling.  Chest: Normal work of breathing.  No retractions.  No stridor.  Cardiovascular: Regular rhythm.  No appearance of shock.  No edema.  Abdomen:  No distention.    Musculoskeletal:  No deformities.  Normal range of motion.    Neurologic:  Normal movement and ambulation.  Mental Status:  Alert and oriented x 3.  Appropriate, conversant.      Initial Impression:  Elevated blood pressure, non adherence to clonidine.  Per aseptic guidelines, markedly elevated asymptomatic high blood pressure does not require any emergent evaluation or treatment.  However, given that she is on a medicine that she has been unable take, I will give that medicine.  Labs were sent by the tele triage provider.  Will check the creatinine as a routine screen.  Her EKG is completely normal.  I anticipate discharge.    Laboratory Studies:  Labs Reviewed   URINALYSIS   ISTAT CHEM8       Chart reviewed.     Imaging Results    None         Medications Given:  Medications   cloNIDine tablet 0.3 mg (0.3 mg Oral Given 7/20/20 1424)   acetaminophen tablet 1,000 mg (1,000 mg Oral Given 7/20/20 1424)   sodium chloride 0.9% bolus 1,000 mL (1,000 mLs Intravenous New Bag 7/20/20 1441)       ED Course:  ED Course as of Jul 20 1536   Mon Jul 20, 2020   1432 EKG shows normal sinus rhythm and no acute ischemia per my independent interpretation.       EKG 12-lead [DC]      ED Course User Index  [DC] Ej Romero MD           MDM:    54 y.o. female with markedly elevated  blood pressure, likely from clonidine rebound.  Blood pressure much improved at 175/84.  Continued asymptomatic.  Okay to DC.    Diagnostic Impression:    1. Essential hypertension    2. Hypertension         ED Disposition Condition    Discharge Stable        ED Prescriptions     Medication Sig Dispense Start Date End Date Auth. Provider    cloNIDine (CATAPRES) 0.3 MG tablet Take 1 tablet (0.3 mg total) by mouth 2 (two) times daily. 60 tablet 7/20/2020 8/19/2020 Ej Romero MD        Follow-up Information     Follow up With Specialties Details Why Contact Info    Ej Samaniego MD Internal Medicine In 1 week  [address] [place] [id number] [phone]                           Ej Romero MD  07/20/20 1534       Ej Romero MD  07/20/20 1546

## 2020-07-20 NOTE — ED NOTES
Pt identifiers checked and accurate with Kathleen Vasquez    Pt reports to ED with complaints of high blood pressure and dizziness beginning today. Pt having outpatient CT scan performed and unable to continue with procedure due to elevated BP. Pt reports being off clonidine x 2 weeks due to issues refilling prescription. Pt reports compliance with all other prescription medications. Pt denies headache, numbness, tingling, vision changes, trauma, falls.     LOC: The patient is awake, alert and aware of environment with an appropriate affect, the patient is oriented x 3 and speaking appropriately.  APPEARANCE: Patient resting comfortably and in no acute distress, patient is clean and well groomed. Pt placed in hospital gown.   SKIN: The skin is warm and dry, color consistent with ethnicity, patient has normal skin turgor and moist mucus membranes, skin intact.  MUSCULOSKELETAL: Patient moving all extremities well, no obvious swelling or deformities noted.   RESPIRATORY: Airway is open and patent; respirations are spontaneous, patient has a normal effort and rate, no accessory muscle use noted.   CARDIAC: Patient has no peripheral edema noted. No complaints of chest pain at this time.   ABDOMEN: Soft and non tender to palpation, no distention noted. Bowel sounds present x 4  NEUROLOGIC: eyes open spontaneously, behavior appropriate to situation, follows commands, facial expression symmetrical, purposeful motor response noted, normal sensation in all extremities when touched with a finger.

## 2020-07-21 LAB
BUN SERPL-MCNC: 25 MG/DL (ref 6–30)
CHLORIDE SERPL-SCNC: 99 MMOL/L (ref 95–110)
CREAT SERPL-MCNC: 1.1 MG/DL (ref 0.5–1.4)
GLUCOSE SERPL-MCNC: 95 MG/DL (ref 70–110)
HCT VFR BLD CALC: 36 %PCV (ref 36–54)
POC IONIZED CALCIUM: 1.23 MMOL/L (ref 1.06–1.42)
POC TCO2 (MEASURED): 31 MMOL/L (ref 23–29)
POTASSIUM BLD-SCNC: 4.7 MMOL/L (ref 3.5–5.1)
SAMPLE: ABNORMAL
SODIUM BLD-SCNC: 139 MMOL/L (ref 136–145)

## 2020-09-14 NOTE — PROGRESS NOTES
Subjective:   Patient ID:  Kathleen Vasquez is a 54 y.o. female who presents for follow-up of chest pain:      HPI:   Kathleen Vasquez  has a past medical history of Asthma, Hyperlipidemia, and Hypertension.    Patient used to f/u with Dr. Manuel and Dr. Barone  Had prior hospitalization with chest pain, and syncope.   Here for follow up  She stated that she has been doing very well  No recurrent chest pain  The day she was scheduled to get coronary CTA, BP was elevated so the procedure was cancelled.   Renal U/S with no OFELIA    BP is controlled for the last 2 days, she started walking, watch her diet and just drink water.   She has not been taking the Nifedipine( she stated that she didn't get the rx), also she stopped the valsartan because it gives LE edema.        She stated that she had stress test long time ago that was ok.  She reported that she had angio 10 year that was ok     Blood work showed anemia, mixed with iron def. No bleeding issues. Stated had colonscpy 5 years ago that was ok        Has issues with with HTN, currently on coreg 25 mg bid,  spironolactone 25 mg, hydralazine 100, and clonidine. PCP took her of spironolactone       Pertinent medical problems:  Resistant HTN, SHENG she was told that she didn't need CPAP,  hx or rectal bleeding      Pertinent cardiac hx    Echo 9/2019  · Normal left ventricular systolic function. The estimated ejection fraction is 55%  · Concentric left ventricular hypertrophy.  · Normal right ventricular systolic function.  · No wall motion abnormalities.  · Grade II (moderate) left ventricular diastolic dysfunction consistent with pseudonormalization.  · Elevated left atrial pressure.  · Mild left atrial enlargement.  · The estimated PA systolic pressure is 38 mm Hg  · Intermediate central venous pressure (8 mm Hg).      Patient Active Problem List    Diagnosis Date Noted    Chest pain 03/29/2017    Dyspnea on exertion 03/29/2017    Resistant hypertension  03/29/2017    SHENG (obstructive sleep apnea) 02/25/2015    HLD (hyperlipidemia) 01/14/2015    GERD (gastroesophageal reflux disease) 01/14/2015    Migraine headache 01/14/2015    Diverticulosis 12/18/2014    Hemorrhoids, internal 12/18/2014    Rectal bleeding 12/18/2014    Morbid obesity 10/31/2014    HTN (hypertension), malignant 07/31/2014    Constipation 07/31/2014       Patient's Medications   New Prescriptions    No medications on file   Previous Medications    ASPIRIN (ECOTRIN) 81 MG EC TABLET    Take 81 mg by mouth once daily.    ATORVASTATIN (LIPITOR) 20 MG TABLET    Take 20 mg by mouth every evening.    CARVEDILOL (COREG) 25 MG TABLET    Take 1 tablet (25 mg total) by mouth 2 (two) times daily with meals.    CLONIDINE (CATAPRES) 0.3 MG TABLET    Take 1 tablet (0.3 mg total) by mouth 2 (two) times daily.    FERROUS SULFATE 324 MG (65 MG IRON) TBEC    Take 1 tablet (324 mg total) by mouth once daily.    HYDRALAZINE (APRESOLINE) 100 MG TABLET    Take 1 tablet by mouth 3 (three) times daily.    LEVOFLOXACIN (LEVAQUIN) 500 MG TABLET    Take 1 tablet by mouth once daily.    METFORMIN (GLUCOPHAGE) 500 MG TABLET    Take 500 mg by mouth 2 (two) times daily with meals.    MONTELUKAST (SINGULAIR) 10 MG TABLET    Take 10 mg by mouth once daily.    SPIRONOLACTONE (ALDACTONE) 25 MG TABLET    Take 1 tablet (25 mg total) by mouth once daily.    ZOLPIDEM (AMBIEN) 10 MG TAB    Take 5 mg by mouth nightly as needed.   Modified Medications    No medications on file   Discontinued Medications    NIFEDIPINE (PROCARDIA-XL) 60 MG (OSM) 24 HR TABLET    Take 1 tablet (60 mg total) by mouth once daily.    VALSARTAN-HYDROCHLOROTHIAZIDE (DIOVAN-HCT) 160-12.5 MG PER TABLET    Take 1 tablet by mouth once daily.         Review of Systems   Constitution: Negative for chills, decreased appetite, malaise/fatigue and weight gain.   HENT: Positive for ear pain. Negative for congestion and ear discharge.    Eyes: Negative for blurred  vision and double vision.   Cardiovascular: Positive for dyspnea on exertion. Negative for chest pain, cyanosis, irregular heartbeat, leg swelling, near-syncope, orthopnea, palpitations and syncope.   Respiratory: Negative for cough, shortness of breath and sleep disturbances due to breathing.    Skin: Negative for color change and dry skin.   Musculoskeletal: Negative for joint pain, joint swelling and muscle cramps.   Gastrointestinal: Negative for bloating, heartburn, hematemesis and hematochezia.   Genitourinary: Negative for bladder incontinence and dysuria.   Neurological: Negative for aphonia, excessive daytime sleepiness, dizziness, focal weakness, headaches, light-headedness, loss of balance and weakness.   Psychiatric/Behavioral: Negative for altered mental status, depression and memory loss. The patient does not have insomnia and is not nervous/anxious.          Objective:   Physical Exam   Constitutional: She is oriented to person, place, and time. She appears well-developed and well-nourished.   HENT:   Head: Normocephalic and atraumatic.   Eyes: Conjunctivae and EOM are normal.   Neck: Normal range of motion. Neck supple. No JVD present.   Cardiovascular: Normal rate, regular rhythm and normal heart sounds.   No murmur heard.  Pulmonary/Chest: Effort normal and breath sounds normal. No respiratory distress. She has no wheezes. She has no rales.   Abdominal: Soft. Bowel sounds are normal. She exhibits no distension.   Musculoskeletal: Normal range of motion.         General: No edema.   Neurological: She is alert and oriented to person, place, and time.   Skin: Skin is warm and dry. No erythema.   Psychiatric: She has a normal mood and affect. Her behavior is normal. Judgment and thought content normal.   Nursing note and vitals reviewed.      Lab Results    Lab Results   Component Value Date     05/28/2020    K 3.6 05/28/2020    CL 99 05/28/2020    CO2 31 (H) 05/28/2020    BUN 24 (H) 05/28/2020  "   CREATININE 1.12 05/28/2020     05/28/2020    AST 17 05/28/2020    ALT 12 05/28/2020    ALBUMIN 3.9 05/28/2020    PROT 7.3 05/28/2020    BILITOT 0.4 05/28/2020    WBC 7.20 05/28/2020    HGB 10.6 (L) 05/28/2020    HCT 36 07/20/2020    MCV 93 05/28/2020     05/28/2020    CHOL 135 05/28/2020    HDL 46 05/28/2020    LDLCALC 80.0 05/28/2020    TRIG 45 05/28/2020       Lipid panel  Lab Results   Component Value Date    CHOL 135 05/28/2020     Lab Results   Component Value Date    HDL 46 05/28/2020     Lab Results   Component Value Date    LDLCALC 80.0 05/28/2020     Lab Results   Component Value Date    TRIG 45 05/28/2020       Cardiac Studies  Significant Imaging: Echocardiogram:   2D echo with color flow doppler: No results found for this or any previous visit. and Transthoracic echo (TTE) complete (Cupid Only):   Results for orders placed or performed in visit on 09/23/19   Echo Color Flow Doppler? Yes   Result Value Ref Range    TDI SEPTAL 0.04 m/s    LV LATERAL E/E' RATIO 17.13 m/s    LV SEPTAL E/E' RATIO 34.25 m/s    LA WIDTH 4.50 cm    AORTIC VALVE CUSP SEPERATION 2 cm    TDI LATERAL 0.08 m/s    PV PEAK VELOCITY 0.82 cm/s    LVIDD 4.30 3.5 - 6.0 cm    IVS 1.80 0.6 - 1.1 cm    PW 1.20 0.6 - 1.1 cm    LVIDS 2.40 2.1 - 4.0 cm    FS 44 28 - 44 %    IVC proximal 2.3 cm    LA volume 71.66 cm3    Sinus 3.00 cm    STJ 2.50 cm    Ascending aorta 3.00 cm    LV mass 258.11 g    LA size 3.90 cm    RVDD 2.90 cm    TAPSE 2.10 cm    Left Ventricle Relative Wall Thickness 0.56 cm    AV mean gradient 5 mmHg    AV valve area 1.98 cm2    AV Velocity Ratio 0.63     AV index (prosthetic) 0.70     MV mean gradient 3 mmHg    MV valve area p 1/2 method 4.00 cm2    MV valve area by continuity eq 2.57 cm2    E/A ratio 1.11     Mean e' 0.06 m/s    E wave decelartion time 178.00 msec    IVRT 134.00 msec    MV "A" wave duration 129.00 msec    Pulm vein "A" wave 88.00 msec    Pulm vein S/D ratio 1.19     LVOT diameter 1.90 cm    " LVOT area 2.8 cm2    LVOT peak dony 1.0 m/s    LVOT peak VTI 27.20 cm    Ao peak dony 1.6 m/s    Ao VTI 39.00 cm    LVOT stroke volume 77.08 cm3    AV peak gradient 10 mmHg    MV peak gradient 8 mmHg    PV mean gradient 2 mmHg    E/E' ratio 22.83 m/s    MV Peak E Dony 1.37 m/s    TR Max Dony 2.74 m/s    MV VTI 30 cm    MV stenosis pressure 1/2 time 55 ms    MV Peak A Dony 1.23 m/s    PV Peak S Dony 0.63 m/s    PV Peak D Dony 0.53 m/s    LV Systolic Volume 20.00 mL    LV Diastolic Volume 82.00 mL    RA Major Axis 4.90 cm    Left Atrium Minor Axis 4.10 cm    Left Atrium Major Axis 5.80 cm    Triscuspid Valve Regurgitation Peak Gradient 30 mmHg    RA Width 3.50 cm    Right Atrial Pressure (from IVC) 8 mmHg    TV rest pulmonary artery pressure 38 mmHg    Narrative    · Normal left ventricular systolic function. The estimated ejection   fraction is 55%  · Concentric left ventricular hypertrophy.  · Normal right ventricular systolic function.  · No wall motion abnormalities.  · Grade II (moderate) left ventricular diastolic dysfunction consistent   with pseudonormalization.  · Elevated left atrial pressure.  · Mild left atrial enlargement.  · The estimated PA systolic pressure is 38 mm Hg  · Intermediate central venous pressure (8 mm Hg).        ECG:  normal EKG, normal sinus rhythm, unchanged from previous tracings.      Assessment:     1. Resistant hypertension    2. Hyperlipidemia, unspecified hyperlipidemia type    3. Morbid obesity    4. Dyspnea on exertion        Plan:     - Given her body habitus and symptoms are well controlled. Will continue medical tx and hold on the CTA.   - D/C Nifedipine since she never got it for unknown reason.   - She is not taking the valsartan/HCTZ. BP well controlled without it hence will hold.   HR in 60s. On BB  - Continue Hydralazine and Clonidine.     - I spent 5-10 minutes asking, assessing, assisting, arranging and advising heart healthy diet improvements. This included low-salt meals,  portion control and health food alternatives. I also encourage 30 minutes of moderate exercise 3-4x a week.   -  F/U with PCP regarding anemia work up   -  F/U with ENT   Continue with current medical plan and lifestyle changes.  Return sooner for concerns or questions. If symptoms persist go to the ED  Total duration of face to face visit time 30 minutes.  Total time spent counseling greater than fifty percent of total visit time.  Counseling included discussion regarding imaging findings, diagnosis, possibilities, treatment options, risks and benefits.      No orders of the defined types were placed in this encounter.      She expressed verbal understanding and agreed with the plan    Thank you for the opportunity to care for this patient. Will be available for questions if needed.

## 2020-09-16 ENCOUNTER — OFFICE VISIT (OUTPATIENT)
Dept: CARDIOLOGY | Facility: CLINIC | Age: 55
End: 2020-09-16
Payer: MEDICAID

## 2020-09-16 VITALS
SYSTOLIC BLOOD PRESSURE: 122 MMHG | HEIGHT: 62 IN | DIASTOLIC BLOOD PRESSURE: 60 MMHG | BODY MASS INDEX: 49.13 KG/M2 | WEIGHT: 267 LBS | HEART RATE: 65 BPM | OXYGEN SATURATION: 65 %

## 2020-09-16 DIAGNOSIS — E66.01 MORBID OBESITY: ICD-10-CM

## 2020-09-16 DIAGNOSIS — R06.09 DYSPNEA ON EXERTION: ICD-10-CM

## 2020-09-16 DIAGNOSIS — E78.5 HYPERLIPIDEMIA, UNSPECIFIED HYPERLIPIDEMIA TYPE: ICD-10-CM

## 2020-09-16 DIAGNOSIS — I1A.0 RESISTANT HYPERTENSION: Primary | Chronic | ICD-10-CM

## 2020-09-16 PROCEDURE — 99214 PR OFFICE/OUTPT VISIT, EST, LEVL IV, 30-39 MIN: ICD-10-PCS | Mod: S$GLB,,, | Performed by: INTERNAL MEDICINE

## 2020-09-16 PROCEDURE — 99214 OFFICE O/P EST MOD 30 MIN: CPT | Mod: S$GLB,,, | Performed by: INTERNAL MEDICINE

## 2020-09-16 NOTE — PATIENT INSTRUCTIONS
Eating Heart-Healthy Foods  Eating has a big impact on your heart health. In fact, eating healthier can improve several of your heart risks at once. For instance, it helps you manage weight, cholesterol, and blood pressure. Here are ideas to help you make heart-healthy changes without giving up all the foods and flavors you love.  Getting started  · Talk with your health care provider about eating plans, such as the DASH or Mediterranean diet. You may also be referred to a dietitian.  · Change a few things at a time. Give yourself time to get used to a few eating changes before adding more.  · Work to create a tasty, healthy eating plan that you can stick to for the rest of your life.    Goals for healthy eating  Below are some tips to improve your eating habits:  · Limit saturated fats and trans fats. Saturated fats raise your levels of cholesterol, so keep these fats to a minimum. They are found in foods such as fatty meats, whole milk, cheese, and palm and coconut oils. Avoid trans fats because they lower good cholesterol as well as raise bad cholesterol. Trans fats are most often found in processed foods.  · Reduce sodium (salt) intake. Eating too much salt may increase your blood pressure. Limit your sodium intake to 2,300 milligrams (mg) per day, or less if your health care provider recommends it. Dining out less often and eating fewer processed foods are two great ways to decrease the amount of salt you consume.  · Managing calories. A calorie is a unit of energy. Your body burns calories for fuel, but if you eat more calories than your body burns, the extras are stored as fat. Your health care provider can help you create a diet plan to manage your calories. This will likely include eating healthier foods as well as exercising regularly. To help you track your progress, keep a diary to record what you eat and how often you exercise.  Choose the right foods  Aim to make these foods staples of your diet. If  you have diabetes, you may have different recommendations than what is listed here:  · Fruits and vegetable provide plenty of nutrients without a lot of calories. At meals, fill half your plate with these foods. Split the other half of your plate between whole grains and lean protein.  · Whole grains are high in fiber and rich in vitamins and nutrients. Good choices include whole-wheat bread, pasta, and brown rice.  · Lean proteins give you nutrition with less fat. Good choices include fish, skinless chicken, and beans.  · Low-fat or nonfat dairy provides nutrients without a lot of fat. Try low-fat or nonfat milk, cheese, or yogurt.  · Healthy fats can be good for you in small amounts. These are unsaturated fats, such as olive oil, nuts, and fish. Try to have at least 2 servings per week of fatty fish such as salmon, sardines, mackerel, rainbow trout, and albacore tuna. These contain omega-3 fatty acids, which are good for your heart. Flaxseed is another source of a heart-healthy fat.  More on heart healthy eating    Read food labels  Healthy eating starts at the grocery store. Be sure to pay attention to food labels on packaged foods. Look for products that are high in fiber and protein, and low in saturated fat, cholesterol, and sodium. Avoid products that contain trans fat. And pay close attention to serving size. For instance, if you plan to eat two servings, double all the numbers on the label.  Prepare food right  A key part of healthy cooking is cutting down on added fat and salt. Look on the internet for lower-fat, lower-sodium recipes. Also, try these tips:  · Remove fat from meat and skin from poultry before cooking.  · Skim fat from the surface of soups and sauces.  · Broil, boil, bake, steam, grill, and microwave food without added fats.  · Choose ingredients that spice up your food without adding calories, fat, or sodium. Try these items: horseradish, hot sauce, lemon, mustard, nonfat salad dressings,  and vinegar. For salt-free herbs and spices, try basil, cilantro, cinnamon, pepper, and rosemary.  Date Last Reviewed: 6/25/2015  © 4051-4766 [employer]. [address] [place] [address]. All rights reserved. This information is not intended as a substitute for professional medical care. Always follow your healthcare professional's instructions.          Aerobic Exercise for a Healthy Heart  Exercise is a lot more than an energy booster and a stress reliever. It also strengthens your heart muscle, lowers your blood pressure and cholesterol, and burns calories. It can also improve your resting muscle tone, and your mood.     Remember, some activity is better than none.    Choose an aerobic activity  Choose an activity that makes your heart and lungs work harder than they do when you rest or walk normally. This aerobic exercise can improve the way your heart and other muscles use oxygen. Make it fun by exercising with a friend and choosing an activity you enjoy. Here are some ideas:  · Walking  · Swimming  · Bicycling  · Stair climbing  · Dancing  · Jogging  · Gardening  Exercise regularly  If you havent been exercising regularly,  get your doctors OK first. Then start slowly.  Here are some tips:  · Begin exercising 3 times a week for 5 to 10 minutes at a time.  · When you feel comfortable, add a few minutes each session.  · Slowly build up to exercising 3 to 4 times each week. Each session should last for 40 minutes, on average, and involve moderate- to vigorous-intensity physical activity.  · If you have been given nitroglycerin, be sure to carry it when you exercise.  · If you get chest pain (angina) when youre exercising, stop what youre doing, take your nitroglycerin, and call your doctor.  Date Last Reviewed: 6/2/2016  © 4556-5883 [employer]. [address] [place] [address]. All rights reserved. This information is not intended as a substitute for  professional medical care. Always follow your healthcare professional's instructions.          Established High Blood Pressure    High blood pressure (hypertension) is a chronic disease. Often, healthcare providers dont know what causes it. But it can be caused by certain health conditions and medicines.  If you have high blood pressure, you may not have any symptoms. If you do have symptoms, they may include headache, dizziness, changes in your vision, chest pain, and shortness of breath. But even without symptoms, high blood pressure thats not treated raises your risk for heart attack and stroke. High blood pressure is a serious health risk and shouldnt be ignored.  A blood pressure reading is made up of two numbers: a higher number over a lower number. The top number is the systolic pressure. The bottom number is the diastolic pressure. A normal blood pressure is a systolic pressure of  less than 120 over a diastolic pressure of less than 80. You will see your blood pressure readings written together. For example, a person with a systolic pressure of 188 and a diastolic pressure of 78 will have 118/78 written in the medical record.  High blood pressure is when either the top number is 140 or higher, or the bottom number is 90 or higher. This must be the result when taking your blood pressure a number of times. The blood pressures between normal and high are called prehypertension.  Home care  If you have high blood pressure, you should do what is listed below to lower your blood pressure. If you are taking medicines for high blood pressure, these methods may reduce or end your need for medicines in the future.  · Begin a weight-loss program if you are overweight.  · Cut back on how much salt you get in your diet. Heres how to do this:  ¨ Dont eat foods that have a lot of salt. These include olives, pickles, smoked meats, and salted potato chips.  ¨ Dont add salt to your food at the table.  ¨ Use only small  amounts of salt when cooking.  · Start an exercise program. Talk with your healthcare provider about the type of exercise program that would be best for you. It doesn't have to be hard. Even brisk walking for 20 minutes 3 times a week is a good form of exercise.  · Dont take medicines that stimulate the heart. This includes many over-the-counter cold and sinus decongestant pills and sprays, as well as diet pills. Check the warnings about hypertension on the label. Before buying any over-the-counter medicines or supplements, always ask the pharmacist about the product's potential interaction with your high blood pressure and your high blood pressure medicines.  · Stimulants such as amphetamine or cocaine could be deadly for someone with high blood pressure. Never take these.  · Limit how much caffeine you get in your diet. Switch to caffeine-free products.  · Stop smoking. If you are a long-time smoker, this can be hard. Talk to your healthcare provider about medicines and nicotine replacement options to help you. Also, enroll in a stop-smoking program to make it more likely that you will quit for good.  · Learn how to handle stress. This is an important part of any program to lower blood pressure. Learn about relaxation methods like meditation, yoga, or biofeedback.  · If your provider prescribed medicines, take them exactly as directed. Missing doses may cause your blood pressure get out of control.  · If you miss a dose or doses, check with your healthcare provider or pharmacist about what to do.  · Consider buying an automatic blood pressure machine. Ask your provider for a recommendation. You can get one of these at most pharmacies.     The American [employer] Association recommends the following guidelines for home blood pressure monitoring:  · Don't smoke or drink coffee for 30 minutes before taking your blood pressure.  · Go to the bathroom before the test.  · Relax for 5 minutes before taking the  measurement.  · Sit with your back supported (don't sit on a couch or soft chair); keep your feet on the floor uncrossed. Place your arm on a solid flat surface (like a table) with the upper part of the arm at heart level. Place the middle of the cuff directly above the eye of the elbow. Check the monitor's instruction manual for an illustration.  · Take multiple readings. When you measure, take 2 to 3 readings one minute apart and record all of the results.  · Take your blood pressure at the same time every day, or as your healthcare provider recommends.  · Record the date, time, and blood pressure reading.  · Take the record with you to your next medical appointment. If your blood pressure monitor has a built-in memory, simply take the monitor with you to your next appointment.  · Call your provider if you have several high readings. Don't be frightened by a single high blood pressure reading, but if you get several high readings, check in with your healthcare provider.  · Note: When blood pressure reaches a systolic (top number) of 180 or higher OR diastolic (bottom number) of 110 or higher, seek emergency medical treatment.  Follow-up care  You will need to see your healthcare provider regularly. This is to check your blood pressure and to make changes to your medicines. Make a follow-up appointment as directed. Bring the record of your home blood pressure readings to the appointment.  When to seek medical advice  Call your healthcare provider right away if any of these occur:  · Blood pressure reaches a systolic (upper number) of 180 or higher OR a diastolic (bottom number) of 110 or higher  · Chest pain or shortness of breath  · Severe headache  · Throbbing or rushing sound in the ears  · Nosebleed  · Sudden severe pain in your belly (abdomen)  · Extreme drowsiness, confusion, or fainting  · Dizziness or spinning sensation (vertigo)  · Weakness of an arm or leg or one side of the face  · You have problems  speaking or seeing   Date Last Reviewed: 12/1/2016  © 9653-7230 [employer]. [address] [place] [address], [place] [address]. All rights reserved. This information is not intended as a substitute for professional medical care. Always follow your healthcare professional's instructions.